# Patient Record
Sex: MALE | Race: WHITE | NOT HISPANIC OR LATINO | URBAN - METROPOLITAN AREA
[De-identification: names, ages, dates, MRNs, and addresses within clinical notes are randomized per-mention and may not be internally consistent; named-entity substitution may affect disease eponyms.]

---

## 2018-09-19 ENCOUNTER — INPATIENT (INPATIENT)
Facility: HOSPITAL | Age: 63
LOS: 5 days | Discharge: NURSING, REHAB OR LONG-TERM CARE | DRG: 871 | End: 2018-09-25
Attending: STUDENT IN AN ORGANIZED HEALTH CARE EDUCATION/TRAINING PROGRAM | Admitting: STUDENT IN AN ORGANIZED HEALTH CARE EDUCATION/TRAINING PROGRAM
Payer: COMMERCIAL

## 2018-09-19 VITALS
RESPIRATION RATE: 20 BRPM | DIASTOLIC BLOOD PRESSURE: 87 MMHG | TEMPERATURE: 100 F | OXYGEN SATURATION: 94 % | HEART RATE: 104 BPM | SYSTOLIC BLOOD PRESSURE: 174 MMHG

## 2018-09-19 LAB
ALBUMIN SERPL ELPH-MCNC: 3.2 G/DL — LOW (ref 3.4–5)
ALBUMIN SERPL ELPH-MCNC: 3.4 G/DL — SIGNIFICANT CHANGE UP (ref 3.3–5)
ALP SERPL-CCNC: 64 U/L — SIGNIFICANT CHANGE UP (ref 40–120)
ALP SERPL-CCNC: 74 U/L — SIGNIFICANT CHANGE UP (ref 40–120)
ALT FLD-CCNC: 54 U/L — HIGH (ref 10–45)
ALT FLD-CCNC: 78 U/L — HIGH (ref 12–42)
ANION GAP SERPL CALC-SCNC: 11 MMOL/L — SIGNIFICANT CHANGE UP (ref 5–17)
ANION GAP SERPL CALC-SCNC: 12 MMOL/L — SIGNIFICANT CHANGE UP (ref 9–16)
ANION GAP SERPL CALC-SCNC: 14 MMOL/L — SIGNIFICANT CHANGE UP (ref 5–17)
ANION GAP SERPL CALC-SCNC: 7 MMOL/L — LOW (ref 9–16)
APPEARANCE UR: CLEAR — SIGNIFICANT CHANGE UP
APTT BLD: 30.3 SEC — SIGNIFICANT CHANGE UP (ref 27.5–37.4)
AST SERPL-CCNC: 126 U/L — HIGH (ref 10–40)
AST SERPL-CCNC: 155 U/L — HIGH (ref 15–37)
BASOPHILS NFR BLD AUTO: 0.2 % — SIGNIFICANT CHANGE UP (ref 0–2)
BILIRUB SERPL-MCNC: 0.8 MG/DL — SIGNIFICANT CHANGE UP (ref 0.2–1.2)
BILIRUB SERPL-MCNC: 0.9 MG/DL — SIGNIFICANT CHANGE UP (ref 0.2–1.2)
BILIRUB UR-MCNC: NEGATIVE — SIGNIFICANT CHANGE UP
BLD GP AB SCN SERPL QL: NEGATIVE — SIGNIFICANT CHANGE UP
BLD GP AB SCN SERPL QL: NEGATIVE — SIGNIFICANT CHANGE UP
BUN SERPL-MCNC: 2 MG/DL — LOW (ref 7–23)
BUN SERPL-MCNC: 3 MG/DL — LOW (ref 7–23)
BUN SERPL-MCNC: <3 MG/DL — LOW (ref 7–23)
BUN SERPL-MCNC: <3 MG/DL — LOW (ref 7–23)
CALCIUM SERPL-MCNC: 7.3 MG/DL — LOW (ref 8.5–10.5)
CALCIUM SERPL-MCNC: 7.7 MG/DL — LOW (ref 8.4–10.5)
CALCIUM SERPL-MCNC: 8 MG/DL — LOW (ref 8.5–10.5)
CALCIUM SERPL-MCNC: 8.2 MG/DL — LOW (ref 8.4–10.5)
CHLORIDE SERPL-SCNC: 79 MMOL/L — LOW (ref 96–108)
CHLORIDE SERPL-SCNC: 82 MMOL/L — LOW (ref 96–108)
CHLORIDE SERPL-SCNC: 85 MMOL/L — LOW (ref 96–108)
CHLORIDE SERPL-SCNC: 88 MMOL/L — LOW (ref 96–108)
CO2 SERPL-SCNC: 22 MMOL/L — SIGNIFICANT CHANGE UP (ref 22–31)
CO2 SERPL-SCNC: 25 MMOL/L — SIGNIFICANT CHANGE UP (ref 22–31)
CO2 SERPL-SCNC: 26 MMOL/L — SIGNIFICANT CHANGE UP (ref 22–31)
CO2 SERPL-SCNC: 27 MMOL/L — SIGNIFICANT CHANGE UP (ref 22–31)
COLOR SPEC: YELLOW — SIGNIFICANT CHANGE UP
CREAT ?TM UR-MCNC: 16 MG/DL — SIGNIFICANT CHANGE UP
CREAT SERPL-MCNC: 0.41 MG/DL — LOW (ref 0.5–1.3)
CREAT SERPL-MCNC: 0.44 MG/DL — LOW (ref 0.5–1.3)
CREAT SERPL-MCNC: 0.62 MG/DL — SIGNIFICANT CHANGE UP (ref 0.5–1.3)
CREAT SERPL-MCNC: 0.63 MG/DL — SIGNIFICANT CHANGE UP (ref 0.5–1.3)
DIFF PNL FLD: NEGATIVE — SIGNIFICANT CHANGE UP
ETHANOL SERPL-MCNC: <3 MG/DL — SIGNIFICANT CHANGE UP
GLUCOSE SERPL-MCNC: 103 MG/DL — HIGH (ref 70–99)
GLUCOSE SERPL-MCNC: 80 MG/DL — SIGNIFICANT CHANGE UP (ref 70–99)
GLUCOSE SERPL-MCNC: 87 MG/DL — SIGNIFICANT CHANGE UP (ref 70–99)
GLUCOSE SERPL-MCNC: 92 MG/DL — SIGNIFICANT CHANGE UP (ref 70–99)
GLUCOSE UR QL: NEGATIVE — SIGNIFICANT CHANGE UP
HBV SURFACE AG SER-ACNC: SIGNIFICANT CHANGE UP
HCT VFR BLD CALC: 37.1 % — LOW (ref 39–50)
HCT VFR BLD CALC: 38.5 % — LOW (ref 39–50)
HCV AB S/CO SERPL IA: 0.57 S/CO — SIGNIFICANT CHANGE UP
HCV AB SERPL-IMP: SIGNIFICANT CHANGE UP
HGB BLD-MCNC: 13.7 G/DL — SIGNIFICANT CHANGE UP (ref 13–17)
HGB BLD-MCNC: 14.5 G/DL — SIGNIFICANT CHANGE UP (ref 13–17)
INR BLD: 0.9 — SIGNIFICANT CHANGE UP (ref 0.88–1.16)
KETONES UR-MCNC: NEGATIVE — SIGNIFICANT CHANGE UP
LACTATE SERPL-SCNC: 1.9 MMOL/L — SIGNIFICANT CHANGE UP (ref 0.4–2)
LEUKOCYTE ESTERASE UR-ACNC: NEGATIVE — SIGNIFICANT CHANGE UP
LYMPHOCYTES # BLD AUTO: 8.2 % — LOW (ref 13–44)
MAGNESIUM SERPL-MCNC: 1.5 MG/DL — LOW (ref 1.6–2.6)
MAGNESIUM SERPL-MCNC: 3 MG/DL — HIGH (ref 1.6–2.6)
MCHC RBC-ENTMCNC: 32.5 PG — SIGNIFICANT CHANGE UP (ref 27–34)
MCHC RBC-ENTMCNC: 33 PG — SIGNIFICANT CHANGE UP (ref 27–34)
MCHC RBC-ENTMCNC: 36.9 G/DL — HIGH (ref 32–36)
MCHC RBC-ENTMCNC: 37.7 G/DL — HIGH (ref 32–36)
MCV RBC AUTO: 87.7 FL — SIGNIFICANT CHANGE UP (ref 80–100)
MCV RBC AUTO: 87.9 FL — SIGNIFICANT CHANGE UP (ref 80–100)
MONOCYTES NFR BLD AUTO: 2.9 % — SIGNIFICANT CHANGE UP (ref 2–14)
NEUTROPHILS NFR BLD AUTO: 88.7 % — HIGH (ref 43–77)
NITRITE UR-MCNC: NEGATIVE — SIGNIFICANT CHANGE UP
OSMOLALITY SERPL: 256 MOSM/KG — LOW (ref 280–301)
OSMOLALITY UR: 139 MOSMOL/KG — SIGNIFICANT CHANGE UP (ref 100–650)
PCO2 BLDV: 34 MMHG — LOW (ref 41–51)
PCP SPEC-MCNC: SIGNIFICANT CHANGE UP
PH BLDV: 7.47 — HIGH (ref 7.32–7.43)
PH UR: 7 — SIGNIFICANT CHANGE UP (ref 5–8)
PHOSPHATE SERPL-MCNC: 2.1 MG/DL — LOW (ref 2.5–4.5)
PLATELET # BLD AUTO: 76 K/UL — LOW (ref 150–400)
PLATELET # BLD AUTO: 79 K/UL — LOW (ref 150–400)
PO2 BLDV: 37 MMHG — SIGNIFICANT CHANGE UP (ref 35–40)
POTASSIUM SERPL-MCNC: 3.2 MMOL/L — LOW (ref 3.5–5.3)
POTASSIUM SERPL-MCNC: 3.4 MMOL/L — LOW (ref 3.5–5.3)
POTASSIUM SERPL-MCNC: 3.6 MMOL/L — SIGNIFICANT CHANGE UP (ref 3.5–5.3)
POTASSIUM SERPL-MCNC: 3.9 MMOL/L — SIGNIFICANT CHANGE UP (ref 3.5–5.3)
POTASSIUM SERPL-SCNC: 3.2 MMOL/L — LOW (ref 3.5–5.3)
POTASSIUM SERPL-SCNC: 3.4 MMOL/L — LOW (ref 3.5–5.3)
POTASSIUM SERPL-SCNC: 3.6 MMOL/L — SIGNIFICANT CHANGE UP (ref 3.5–5.3)
POTASSIUM SERPL-SCNC: 3.9 MMOL/L — SIGNIFICANT CHANGE UP (ref 3.5–5.3)
PROT SERPL-MCNC: 6.2 G/DL — SIGNIFICANT CHANGE UP (ref 6–8.3)
PROT SERPL-MCNC: 6.9 G/DL — SIGNIFICANT CHANGE UP (ref 6.4–8.2)
PROT UR-MCNC: NEGATIVE MG/DL — SIGNIFICANT CHANGE UP
PROTHROM AB SERPL-ACNC: 10 SEC — SIGNIFICANT CHANGE UP (ref 9.8–12.7)
RBC # BLD: 4.22 M/UL — SIGNIFICANT CHANGE UP (ref 4.2–5.8)
RBC # BLD: 4.39 M/UL — SIGNIFICANT CHANGE UP (ref 4.2–5.8)
RBC # FLD: 11 % — SIGNIFICANT CHANGE UP (ref 10.3–14.5)
RBC # FLD: 11.2 % — SIGNIFICANT CHANGE UP (ref 10.3–16.9)
RH IG SCN BLD-IMP: POSITIVE — SIGNIFICANT CHANGE UP
RH IG SCN BLD-IMP: POSITIVE — SIGNIFICANT CHANGE UP
SAO2 % BLDV: 73 % — SIGNIFICANT CHANGE UP
SODIUM SERPL-SCNC: 113 MMOL/L — CRITICAL LOW (ref 132–145)
SODIUM SERPL-SCNC: 115 MMOL/L — CRITICAL LOW (ref 132–145)
SODIUM SERPL-SCNC: 124 MMOL/L — LOW (ref 135–145)
SODIUM SERPL-SCNC: 126 MMOL/L — LOW (ref 135–145)
SODIUM UR-SCNC: 20 MMOL/L — SIGNIFICANT CHANGE UP
SP GR SPEC: <=1.005 — SIGNIFICANT CHANGE UP (ref 1–1.03)
UROBILINOGEN FLD QL: 0.2 E.U./DL — SIGNIFICANT CHANGE UP
UUN UR-MCNC: 79 MG/DL — SIGNIFICANT CHANGE UP
WBC # BLD: 6.1 K/UL — SIGNIFICANT CHANGE UP (ref 3.8–10.5)
WBC # BLD: 7.4 K/UL — SIGNIFICANT CHANGE UP (ref 3.8–10.5)
WBC # FLD AUTO: 6.1 K/UL — SIGNIFICANT CHANGE UP (ref 3.8–10.5)
WBC # FLD AUTO: 7.4 K/UL — SIGNIFICANT CHANGE UP (ref 3.8–10.5)

## 2018-09-19 PROCEDURE — 99220: CPT

## 2018-09-19 PROCEDURE — 99223 1ST HOSP IP/OBS HIGH 75: CPT | Mod: GC

## 2018-09-19 PROCEDURE — 74177 CT ABD & PELVIS W/CONTRAST: CPT | Mod: 26

## 2018-09-19 PROCEDURE — 70450 CT HEAD/BRAIN W/O DYE: CPT | Mod: 26

## 2018-09-19 PROCEDURE — 71045 X-RAY EXAM CHEST 1 VIEW: CPT | Mod: 26

## 2018-09-19 PROCEDURE — 71260 CT THORAX DX C+: CPT | Mod: 26

## 2018-09-19 PROCEDURE — 72125 CT NECK SPINE W/O DYE: CPT | Mod: 26

## 2018-09-19 RX ORDER — INFLUENZA VIRUS VACCINE 15; 15; 15; 15 UG/.5ML; UG/.5ML; UG/.5ML; UG/.5ML
0.5 SUSPENSION INTRAMUSCULAR ONCE
Qty: 0 | Refills: 0 | Status: COMPLETED | OUTPATIENT
Start: 2018-09-19 | End: 2018-09-19

## 2018-09-19 RX ORDER — SODIUM CHLORIDE 9 MG/ML
1000 INJECTION INTRAMUSCULAR; INTRAVENOUS; SUBCUTANEOUS ONCE
Qty: 0 | Refills: 0 | Status: COMPLETED | OUTPATIENT
Start: 2018-09-19 | End: 2018-09-19

## 2018-09-19 RX ORDER — SODIUM CHLORIDE 9 MG/ML
1000 INJECTION, SOLUTION INTRAVENOUS
Qty: 0 | Refills: 0 | Status: DISCONTINUED | OUTPATIENT
Start: 2018-09-19 | End: 2018-09-19

## 2018-09-19 RX ORDER — SODIUM CHLORIDE 9 MG/ML
1000 INJECTION, SOLUTION INTRAVENOUS
Qty: 0 | Refills: 0 | Status: DISCONTINUED | OUTPATIENT
Start: 2018-09-19 | End: 2018-09-20

## 2018-09-19 RX ORDER — THIAMINE MONONITRATE (VIT B1) 100 MG
500 TABLET ORAL ONCE
Qty: 0 | Refills: 0 | Status: DISCONTINUED | OUTPATIENT
Start: 2018-09-19 | End: 2018-09-19

## 2018-09-19 RX ORDER — MAGNESIUM SULFATE 500 MG/ML
4 VIAL (ML) INJECTION ONCE
Qty: 0 | Refills: 0 | Status: COMPLETED | OUTPATIENT
Start: 2018-09-19 | End: 2018-09-19

## 2018-09-19 RX ORDER — FLUCONAZOLE 150 MG/1
200 TABLET ORAL EVERY 24 HOURS
Qty: 0 | Refills: 0 | Status: DISCONTINUED | OUTPATIENT
Start: 2018-09-20 | End: 2018-09-20

## 2018-09-19 RX ORDER — HEPARIN SODIUM 5000 [USP'U]/ML
5000 INJECTION INTRAVENOUS; SUBCUTANEOUS EVERY 8 HOURS
Qty: 0 | Refills: 0 | Status: DISCONTINUED | OUTPATIENT
Start: 2018-09-19 | End: 2018-09-25

## 2018-09-19 RX ORDER — PIPERACILLIN AND TAZOBACTAM 4; .5 G/20ML; G/20ML
3.38 INJECTION, POWDER, LYOPHILIZED, FOR SOLUTION INTRAVENOUS ONCE
Qty: 0 | Refills: 0 | Status: DISCONTINUED | OUTPATIENT
Start: 2018-09-19 | End: 2018-09-19

## 2018-09-19 RX ORDER — VANCOMYCIN HCL 1 G
1000 VIAL (EA) INTRAVENOUS ONCE
Qty: 0 | Refills: 0 | Status: DISCONTINUED | OUTPATIENT
Start: 2018-09-19 | End: 2018-09-19

## 2018-09-19 RX ORDER — POTASSIUM PHOSPHATE, MONOBASIC POTASSIUM PHOSPHATE, DIBASIC 236; 224 MG/ML; MG/ML
30 INJECTION, SOLUTION INTRAVENOUS ONCE
Qty: 0 | Refills: 0 | Status: COMPLETED | OUTPATIENT
Start: 2018-09-19 | End: 2018-09-19

## 2018-09-19 RX ORDER — PIPERACILLIN AND TAZOBACTAM 4; .5 G/20ML; G/20ML
3.38 INJECTION, POWDER, LYOPHILIZED, FOR SOLUTION INTRAVENOUS EVERY 6 HOURS
Qty: 0 | Refills: 0 | Status: DISCONTINUED | OUTPATIENT
Start: 2018-09-19 | End: 2018-09-25

## 2018-09-19 RX ORDER — THIAMINE MONONITRATE (VIT B1) 100 MG
500 TABLET ORAL EVERY 8 HOURS
Qty: 0 | Refills: 0 | Status: DISCONTINUED | OUTPATIENT
Start: 2018-09-19 | End: 2018-09-20

## 2018-09-19 RX ORDER — THIAMINE MONONITRATE (VIT B1) 100 MG
500 TABLET ORAL ONCE
Qty: 0 | Refills: 0 | Status: COMPLETED | OUTPATIENT
Start: 2018-09-19 | End: 2018-09-19

## 2018-09-19 RX ORDER — VANCOMYCIN HCL 1 G
1000 VIAL (EA) INTRAVENOUS EVERY 12 HOURS
Qty: 0 | Refills: 0 | Status: DISCONTINUED | OUTPATIENT
Start: 2018-09-19 | End: 2018-09-20

## 2018-09-19 RX ORDER — DESMOPRESSIN ACETATE 0.1 MG/1
1 TABLET ORAL ONCE
Qty: 0 | Refills: 0 | Status: COMPLETED | OUTPATIENT
Start: 2018-09-19 | End: 2018-09-19

## 2018-09-19 RX ORDER — FLUCONAZOLE 150 MG/1
200 TABLET ORAL EVERY 24 HOURS
Qty: 0 | Refills: 0 | Status: DISCONTINUED | OUTPATIENT
Start: 2018-09-19 | End: 2018-09-19

## 2018-09-19 RX ORDER — FLUCONAZOLE 150 MG/1
200 TABLET ORAL ONCE
Qty: 0 | Refills: 0 | Status: COMPLETED | OUTPATIENT
Start: 2018-09-19 | End: 2018-09-19

## 2018-09-19 RX ADMIN — HEPARIN SODIUM 5000 UNIT(S): 5000 INJECTION INTRAVENOUS; SUBCUTANEOUS at 22:45

## 2018-09-19 RX ADMIN — DESMOPRESSIN ACETATE 1 MICROGRAM(S): 0.1 TABLET ORAL at 17:03

## 2018-09-19 RX ADMIN — Medication 1 MILLIGRAM(S): at 09:22

## 2018-09-19 RX ADMIN — Medication 105 MILLIGRAM(S): at 22:45

## 2018-09-19 RX ADMIN — Medication 100 MILLIGRAM(S): at 15:54

## 2018-09-19 RX ADMIN — PIPERACILLIN AND TAZOBACTAM 200 GRAM(S): 4; .5 INJECTION, POWDER, LYOPHILIZED, FOR SOLUTION INTRAVENOUS at 17:09

## 2018-09-19 RX ADMIN — SODIUM CHLORIDE 200 MILLILITER(S): 9 INJECTION, SOLUTION INTRAVENOUS at 19:19

## 2018-09-19 RX ADMIN — Medication 105 MILLIGRAM(S): at 15:54

## 2018-09-19 RX ADMIN — Medication 100 GRAM(S): at 11:37

## 2018-09-19 RX ADMIN — SODIUM CHLORIDE 1000 MILLILITER(S): 9 INJECTION INTRAMUSCULAR; INTRAVENOUS; SUBCUTANEOUS at 08:31

## 2018-09-19 RX ADMIN — SODIUM CHLORIDE 2000 MILLILITER(S): 9 INJECTION, SOLUTION INTRAVENOUS at 16:48

## 2018-09-19 RX ADMIN — Medication 1 MILLIGRAM(S): at 12:00

## 2018-09-19 RX ADMIN — Medication 250 MILLIGRAM(S): at 11:38

## 2018-09-19 RX ADMIN — SODIUM CHLORIDE 1000 MILLILITER(S): 9 INJECTION INTRAMUSCULAR; INTRAVENOUS; SUBCUTANEOUS at 08:56

## 2018-09-19 RX ADMIN — POTASSIUM PHOSPHATE, MONOBASIC POTASSIUM PHOSPHATE, DIBASIC 85 MILLIMOLE(S): 236; 224 INJECTION, SOLUTION INTRAVENOUS at 19:20

## 2018-09-19 RX ADMIN — SODIUM CHLORIDE 100 MILLILITER(S): 9 INJECTION, SOLUTION INTRAVENOUS at 15:54

## 2018-09-19 RX ADMIN — Medication 1 MILLIGRAM(S): at 08:30

## 2018-09-19 NOTE — ED ADULT NURSE REASSESSMENT NOTE - NS ED NURSE REASSESS COMMENT FT1
patient cleaned of stool and urine incontinence, belongings bagged, placed on monitor, hospital gown

## 2018-09-19 NOTE — CONSULT NOTE ADULT - SUBJECTIVE AND OBJECTIVE BOX
63M with PMHx of alcohol abuse presented to [facility] due to altered mental status and alcohol withdrawal, found to have scrotal erythema, edema, and tenderness to palpation. Patient is poor historian given clinical condition and acutely altered. Patient presented with rash on scrotum and groin. Urology consulted to r/o Soni's gangrene.    Gen: Difficult to arouse  Abd: sNTND  : Erythematous scrotum without fluctuant, edema, tenderness, or crepitus. Perineum without fluctuance, tenderness, edema, or crepitus. Bilateral inner thighs with petechiae similar to that through bilateral lower extremities

## 2018-09-19 NOTE — ED CDU PROVIDER DISPOSITION NOTE - CLINICAL COURSE
admit to Dr [facility] ICU at [place] through [facility] BEDS line, sodium 113, alcohol withdrawal

## 2018-09-19 NOTE — CONSULT NOTE ADULT - ASSESSMENT
63M p/w alcohol withdrawal p/w scrotal erythema    -Low suspicion for Soni's gangrene at this time  -c/w abx  -agree with testicular u/s  -Care per primary team  -Urology to follow  -d/w chief resident and attending on call

## 2018-09-19 NOTE — ED ADULT NURSE REASSESSMENT NOTE - NS ED NURSE REASSESS COMMENT FT1
1mg ativan given iv as per verbal order dr meehan on transfer. all belongings to EMS with money counted and secured in envelope per policy and sent with EMS/patient

## 2018-09-19 NOTE — ED ADULT NURSE REASSESSMENT NOTE - NS ED NURSE REASSESS COMMENT FT1
patient s/p seizure, nasal trumpet had been placed and is still in place, O2 at 100%nrb, patient responds appropriately to noxious stimuli, VS noted. pending ct scan, radiology aware

## 2018-09-19 NOTE — ED PROVIDER NOTE - MEDICAL DECISION MAKING DETAILS
alcohol withdrawal, tremor, weakness, as per wife Brooklyn [phone] pt has alcohol problem for 30+ years, she called the [place] and he was supposed to go to rehab, Sobia from the [employer] local [phone] at [phone] was arranging this, patient is tremulous and has ecchymosis of inner left thigh and is wearing hospital socks, as per wife, patient left the last ER he was in and also declined rehab although sobia offered to have him picked up.  Will administer Ativan for tremors, Librium and will place in CDU, will consult SBIRT.

## 2018-09-19 NOTE — H&P ADULT - NSHPSOURCEINFOTX_GEN_ALL_CORE
Wife -Brooklyn ([phone]); [facility] [place] arranging alcohol rehab - [phone] at [phone])

## 2018-09-19 NOTE — ED ADULT NURSE NOTE - OBJECTIVE STATEMENT
patient tremulous, from work, oriented. reports last alcohol use was 2 days ago, patient incontinent of stool, large excoriation/bruise to buttocks and left thigh also left scalp with dried blood area, he does report recent fall

## 2018-09-19 NOTE — H&P ADULT - NSHPLABSRESULTS_GEN_ALL_CORE
.  LABS:                         13.7   6.1   )-----------( 76       ( 19 Sep 2018 14:53 )             37.1     09-19    115<LL>  |  82<L>  |  <3<L>  ----------------------------<  87  3.9   |  26  |  0.62    Ca    7.3<L>      19 Sep 2018 10:42  Mg     1.5     09-19    TPro  6.9  /  Alb  3.2<L>  /  TBili  0.8  /  DBili  x   /  AST  155<H>  /  ALT  78<H>  /  AlkPhos  74  09-19    PT/INR - ( 19 Sep 2018 14:53 )   PT: 10.0 sec;   INR: 0.90          PTT - ( 19 Sep 2018 14:53 )  PTT:30.3 sec  Urinalysis Basic - ( 19 Sep 2018 15:00 )    Color: Yellow / Appearance: Clear / SG: <=1.005 / pH: x  Gluc: x / Ketone: NEGATIVE  / Bili: Negative / Urobili: 0.2 E.U./dL   Blood: x / Protein: NEGATIVE mg/dL / Nitrite: NEGATIVE   Leuk Esterase: NEGATIVE / RBC: x / WBC x   Sq Epi: x / Non Sq Epi: x / Bacteria: x            Lactate, Blood: 1.9 mmoL/L (09-19 @ 11:23)      RADIOLOGY, EKG & ADDITIONAL TESTS: Reviewed.  < from: Xray Chest 1 View AP/PA (09.19.18 @ 08:29) >      IMPRESSION: Frontal view of the chest demonstrates moderate interstitial  pulmonary edema. Possible patchy consolidation within the right lower   lobe. Trace right pleural effusion. Normal heart size. Low lung volumes.    < from: CT Head No Cont (09.19.18 @ 10:41) >      IMPRESSION: No intracranial hemorrhage or acute transcortical infarct.   Probable old right nasal bone fracture.    < from: CT Cervical Spine No Cont (09.19.18 @ 10:42) >    IMPRESSION: Markedly limited examination secondary to motion artifact. No   definite fracture. Anterolisthesis C4and T1. Extensive cervical   spondylosis.

## 2018-09-19 NOTE — PATIENT PROFILE ADULT. - VISION (WITH CORRECTIVE LENSES IF THE PATIENT USUALLY WEARS THEM):
Reading only/Normal vision: sees adequately in most situations; can see medication labels, newsprint

## 2018-09-19 NOTE — ED ADULT NURSE NOTE - NSIMPLEMENTINTERV_GEN_ALL_ED
Implemented All Fall with Harm Risk Interventions:  [place] to call system. Call bell, personal items and telephone within reach. Instruct patient to call for assistance. Room bathroom lighting operational. Non-slip footwear when patient is off stretcher. Physically safe environment: no spills, clutter or unnecessary equipment. Stretcher in lowest position, wheels locked, appropriate side rails in place. Provide visual cue, wrist band, yellow gown, etc. Monitor gait and stability. Monitor for mental status changes and reorient to person, place, and time. Review medications for side effects contributing to fall risk. Reinforce activity limits and safety measures with patient and family. Provide visual clues: red socks.

## 2018-09-19 NOTE — ED CDU PROVIDER INITIAL DAY NOTE - MEDICAL DECISION MAKING DETAILS
alcohol withdrawal, tremor, weakness, as per wife Brooklyn [phone] pt has alcohol problem for 30+ years, she called the [place] and he was supposed to go to rehab, Sobia from the [employer] local [phone] at [phone] was arranging this, patient is tremulous and has ecchymosis of inner left thigh and is wearing hospital socks, as per wife, patient left the last ER he was in and also declined rehab although sobia offered to have him picked up.  Will administer Ativan for tremors, Librium and will place in CDU, will consult SBIRT.

## 2018-09-19 NOTE — ED ADULT NURSE REASSESSMENT NOTE - NS ED NURSE REASSESS COMMENT FT1
patient awake but agitated, has not pulled out nasal trumpet so will continue and also continue with 100%nrb for transfer and ativan iv dose given. report to EMS

## 2018-09-19 NOTE — H&P ADULT - ASSESSMENT
Patient is a 63M with PMHx of alcohol abuse x30 years presented to [facility] for alcohol withdrawal was found to have b/l ecchymoses on proximal inner thighs with associated clear discharge, warmth, tenderness to palpation, and erythema possibly due to cellulitis vs. necrotizing fasciitis vs. Soni's gangrene    ID  #Cellulitis vs. Necrotizing fasciitis: Patient presented with  b/l ecchymoses on proximal inner thighs with associated clear discharge, warmth, tenderness to palpation, and erythema   -c/w Clinda 900mg q8hr  -c/w fluconazole 200mg daily  -c/w zosyn 3.375g q6hr  -c/w vanc 1g q12hr. Vanc trough before 4th dose.  -f/u Testical US   -CT chest demonstrated b/l groundglass opacities that have a peripheral bronchovascular distribution possibly due to pulmonary hemorrhage and/or contusion as pattern is less typical for a multifocal pneumonia however PNA cannot be excluded. Pattern less typical for aspiration. Further, focal atelectatic change involving the posterior basal segment of the left lower lobe versus parenchymal contusion.  -CT A/P demonstrate distention of the urinary bladder with a suggestion of very mild bladder wall thickening with TURP defect that may represent the sequela of bladder outlet obstruction vs. cystitis  -hepatitis panel nonreactive  -UA negative    Neuro  #Alcohol withdrawal  -c/w librium  -c/w ativan 1mg q2hrs for CIWA >8  -c/w thiamine 500mg q8hr  -CIWA 7 (positive for tremulousness, confusion, agitation). C/w serial CIWA monitoring  -c/w multivitamin  -CT head negative for acute hemorrhage or infarct  -c/w D5% @200cc/hr    #Toxic Metabolic Encephalopathy: Patient acutely altered possibly due to alcohol withdrawal vs. infection   -c/w alcohol withdrawal tx as above  -c/w infection tx as above  -utox negative    Renal  BUN/Cr: 2/0.41. Continue to monitor  #Hyponatremia  -Na+ 113 on presentation. Uptrending to Na+ 126 s/p fluids  -continue to monitor    #Hypomagnesemia   -Mg 1.5 on admission. Mg 3 s/p repletion  -continue to monitor    #Hypokalemia  -K+ 3.2 on repeat BMP. s/p repletion  -f/u 8PM BMP    Pulm  #Respiratory Alkalosis: VBG demonstrating pH7.47 with pCO2 34. Likely due to initial respiratory distress from alcohol withdrawal vs. infection. Continue to monitor   -CT Chest findings concerning for possible contusion vs. PNA. Continue to monitor on antibx.   -Saturations 100% on NRFM. Titrated down to NC O2 4L with saturations in %. Continue to titrate O2 as needed    GI  #Transaminitis  -AST//54 - likely due to alcohol abuse given ratio >2:1. Continue to monitor  -CT A/P demonstrated hepatic steatosis  -hepatitis B/C negative    Endo  #Pressure Ulcers: Patient with b/l erythema overlying the DIP joints of toes on physical exam. Possibly 2/2 pressure ulcers   -f/u HbA1C as possibly from underlying DM    Heme  #Thrombocytopenia: Petechial rash appreciated with Plts 79  -no signs of bleeding and Hb 14.5. continue to monitor    CV: No acute hemodynamic instability. Continue to monitor    F: D5 @200cc/hr  E: Replete for K<4 and Mg <2  N: NPO until S&S clears    DVT ppx: Heparin SubQ   Dispo: MICU Patient is a 63M with PMHx of alcohol abuse x30 years presented to [facility] for alcohol withdrawal was found to have b/l ecchymoses on proximal inner thighs with associated clear discharge, warmth, tenderness to palpation, and erythema possibly due to cellulitis vs. necrotizing fasciitis vs. Soni's gangrene    ID  #Cellulitis vs. Necrotizing fasciitis: Patient presented with  b/l erythema on proximal inner thighs with associated clear discharge, warmth, tenderness to palpation, and erythema   -c/w Clinda 900mg q8hr for anti-toxin effect against strep for empiric gas gangrene coverage  -c/w fluconazole 200mg daily for empiric fungal coverage   -c/w zosyn 3.375g q6hr  -c/w vanc 1g q12hr. Vanc trough before 4th dose.   -f/u Testical US   -CT A/P with no signs of gas or abscess. These findings make nec fasciitis less likely.  Also demonstrate distention of the urinary bladder with a suggestion of very mild bladder wall thickening with TURP defect that may represent the sequela of bladder outlet obstruction vs. cystitis  -hepatitis panel nonreactive  -UA negative    Neuro  #Toxic Metabolic Encephalopathy: Patient acutely altered possibly due to alcohol withdrawal vs. infection vs. hyponatremia. Unlikely structural CT head negative for acute hemorrhage or infarct  -c/w alcohol withdrawal tx as below  -c/w infection tx as above  -utox negative  -hyponatremia plan as below    #Alcohol withdrawal  -c/w ativan 1mg q2hrs for CIWA >8  -c/w thiamine 500mg q8hr  -CIWA 7 (positive for tremulousness, confusion, agitation). C/w serial CIWA monitoring  -c/w D5% @200cc/hr    Renal  #Hyponatremia: Patient clinically mildly hypovolemic to euvolemic. Unclear etiology possibly related to hypovolemia (improved Na+ after 1L NS) vs. strongly considering beer potomania vs. dysregulation of ADH given patient's urine Osm low with low specific gravity and having very high free water output suggesting auto-diuresis  -Na+ 113 on presentation. Uptrending to Na+ 126 after collins placement and 1L NS  -in attempts to reverse overcorrection gave 1L D5W and started D5W @200cc/hr and gave 1 mcg of desmopresin IV  -goal Na+ by AM ~121.  -BMP q4hr    #Hypomagnesemia   -Mg 1.5 on admission. Mg 3 s/p repletion  -continue to monitor    #Hypokalemia  -K+ 3.2 on repeat BMP. s/p repletion  -f/u 8PM BMP    Pulm  #Respiratory Alkalosis: VBG demonstrating pH7.47 with pCO2 34. Likely due to initial respiratory distress from alcohol withdrawal vs. infection. Continue to monitor   -Saturations 100% on NRFM. Titrated down to NC O2 4L with saturations in %. Continue to titrate O2 as needed  -CT chest demonstrated b/l groundglass opacities that have a peripheral bronchovascular distribution possibly due to pulmonary hemorrhage and/or contusion as pattern is less typical for a multifocal pneumonia however PNA cannot be excluded. Pattern less typical for aspiration. Further, focal atelectatic change involving the posterior basal segment of the left lower lobe versus parenchymal contusion. Continue to monitor on antibx.     GI  #Transaminitis  -AST//54 - likely due to alcohol abuse given ratio >2:1. Continue to monitor  -CT A/P demonstrated hepatic steatosis  -hepatitis B/C negative    Endo  #Pressure Ulcers: Patient with b/l erythema overlying the DIP joints of toes on physical exam. Possibly 2/2 pressure ulcers   -f/u HbA1C as possibly from underlying DM    Heme  #Thrombocytopenia: Petechial rash appreciated with Plts 79 most likely due to alcoholism  -no signs of bleeding and Hb 14.5. continue to monitor    CV: No acute hemodynamic instability. Continue to monitor    F: D5 @200cc/hr  E: Replete for K<4 and Mg <2  N: NPO until S&S clears    DVT ppx: Heparin SubQ   Dispo: MICU Patient is a 63M with PMHx of alcohol abuse x30 years presented to [facility] for alcohol withdrawal was found to have b/l ecchymoses on proximal inner thighs with associated clear discharge, warmth, tenderness to palpation, and erythema possibly due to cellulitis vs. necrotizing fasciitis vs. Soni's gangrene with hyponatremia and seizure    ID  #Cellulitis vs. Necrotizing fasciitis: Patient presented with  b/l erythema on proximal inner thighs with associated clear discharge, warmth, tenderness to palpation, and erythema   -c/w Clinda 900mg q8hr for anti-toxin effect against strep for empiric gas gangrene coverage  -c/w fluconazole 200mg daily for empiric fungal coverage   -c/w zosyn 3.375g q6hr  -c/w vanc 1g q12hr. Vanc trough before 4th dose.   -f/u Testical US   -CT A/P with no signs of gas or abscess. These findings make nec fasciitis less likely.  Also demonstrate distention of the urinary bladder with a suggestion of very mild bladder wall thickening with TURP defect that may represent the sequela of bladder outlet obstruction vs. cystitis  -hepatitis panel nonreactive  -UA negative    Neuro  #Toxic Metabolic Encephalopathy: Patient acutely altered possibly due to alcohol withdrawal vs. infection vs. hyponatremia. Unlikely structural CT head negative for acute hemorrhage or infarct  -c/w alcohol withdrawal tx as below  -c/w infection tx as above  -utox negative  -hyponatremia plan as below    #Alcohol withdrawal  -c/w ativan 1mg q2hrs for CIWA >8  -c/w thiamine 500mg q8hr  -CIWA 7 (positive for tremulousness, confusion, agitation). C/w serial CIWA monitoring  -c/w D5% @200cc/hr    Renal  #Hyponatremia: Patient clinically mildly hypovolemic to euvolemic. Unclear etiology possibly related to hypovolemia (improved Na+ after 1L NS) vs. strongly considering beer potomania vs. dysregulation of ADH given patient's urine Osm low with low specific gravity and having very high free water output suggesting auto-diuresis  -Na+ 113 on presentation. Uptrending to Na+ 126 after collins placement and 1L NS  -in attempts to reverse overcorrection gave 1L D5W and started D5W @200cc/hr and gave 1 mcg of desmopresin IV  -goal Na+ by AM ~121.  -BMP q4hr    #Hypomagnesemia   -Mg 1.5 on admission. Mg 3 s/p repletion  -continue to monitor    #Hypokalemia  -K+ 3.2 on repeat BMP. s/p repletion  -f/u 8PM BMP    Pulm  #Respiratory Alkalosis: VBG demonstrating pH7.47 with pCO2 34. Likely due to initial respiratory distress from alcohol withdrawal vs. infection. Continue to monitor   -Saturations 100% on NRFM. Titrated down to NC O2 4L with saturations in %. Continue to titrate O2 as needed  -CT chest demonstrated b/l groundglass opacities that have a peripheral bronchovascular distribution possibly due to pulmonary hemorrhage and/or contusion as pattern is less typical for a multifocal pneumonia however PNA cannot be excluded. Pattern less typical for aspiration. Further, focal atelectatic change involving the posterior basal segment of the left lower lobe versus parenchymal contusion. Continue to monitor on antibx.     GI  #Transaminitis  -AST//54 - likely due to alcohol abuse given ratio >2:1. Continue to monitor  -CT A/P demonstrated hepatic steatosis  -hepatitis B/C negative    Endo  #Pressure Ulcers: Patient with b/l erythema overlying the DIP joints of toes on physical exam. Possibly 2/2 pressure ulcers   -f/u HbA1C as possibly from underlying DM    Heme  #Thrombocytopenia: Petechial rash appreciated with Plts 79 most likely due to alcoholism  -no signs of bleeding and Hb 14.5. continue to monitor    CV: No acute hemodynamic instability. Continue to monitor    F: D5 @200cc/hr  E: Replete for K<4 and Mg <2  N: NPO until S&S clears    DVT ppx: Heparin SubQ   Dispo: MICU

## 2018-09-19 NOTE — H&P ADULT - HISTORY OF PRESENT ILLNESS
Patient a 63year old male with PMHx of alcohol abuse presented to [facility] due to altered mental status and alcohol withdrawal, found to have scrotal erythema, edema, and tenderness to palpation. Patient is poor historian given clinical condition and acutely altered. Patient a 63year old male with PMHx of alcohol abuse presented to [facility] due to altered mental status and alcohol withdrawal, found to have scrotal erythema, edema, and tenderness to palpation. Patient is poor historian given clinical condition and acutely altered. Collateral at [facility] obtained with patient's wife. Per wife, patient had alcohol abuse for 30+ years. Patient was supposed to go to rehab and this was being arranged but patient declined. Per patient's wife, he was tremulous and weak  with ecchymosis of inner left thigh. Upon presentation to the ED at [facility], patient was in apparent alcohol withdrawal and had witnessed seizure. Last alcoholic beverage reportedly 2 days prior to presentation.   In ED Patient a 63year old male with PMHx of alcohol abuse presented to [facility] due to altered mental status and alcohol withdrawal, found to have scrotal erythema, edema, and tenderness to palpation. Patient is poor historian given clinical condition and acutely altered. Collateral at [facility] obtained with patient's wife. Per wife, patient had alcohol abuse for 30+ years. Patient was supposed to go to rehab and this was being arranged but patient declined. Per patient's wife, he was tremulous and weak  with ecchymosis of inner left thigh. Upon presentation to the ED at [facility], patient was in apparent alcohol withdrawal and had witnessed seizure and was given ativan, supp O2, and nasal trumpet placed. Last alcoholic beverage reportedly 2 days prior to presentation.     In ED: Temp 99.6, 174/104, , RR 20, SpO2 94% on RA. Labs significant forNa+ 113, Mg 1.5, K 3.6, AST//78, WBC 7.4, Hb 14.5. Patient given Ativan 1mg x4, IV Magnesium 4g, Vanc x1, 1L IV NS. CT head negative for acute intracranial hemorrhage or infarct. CT A/P with no signs of gas or abscess. Also demonstrated distention of the urinary bladder with a suggestion of very mild bladder wall thickening with TURP defect that may represent the sequela of bladder outlet obstruction vs. cystitis

## 2018-09-19 NOTE — ED ADULT TRIAGE NOTE - CHIEF COMPLAINT QUOTE
BIBA for AMS, as per ems he was picked up at his place of work where he hasn't not been in two weeks. Patient appears confused as to situation. Unable to state where he's been in the last two weeks. Denies any medical history. Admits to drinking etoh, last drink was 2 days ago. Noted to have old closed laceration to back of the head. Unable to visualize at this time. Also noted to have defecated on himself.

## 2018-09-19 NOTE — ED PROVIDER NOTE - OBJECTIVE STATEMENT
alcohol withdrawal, tremor, weakness, as per wife Brooklyn [phone] pt has alcohol problem for 30+ years, she called the [place] and he was supposed to go to rehab, Sobia from the [facility] [place] local [phone] at [phone] was arranging this

## 2018-09-19 NOTE — ED PROVIDER NOTE - RAPID ASSESSMENT
BIBEMS with ams, tremulous.  AOB.  No VS derangements on arrival.  Workup ordered. BIBEMS with ams, tremulous.  AOB.  No VS derangements on arrival.  Workup ordered.  As per EMS two recent transports this month.  NO previous visits here

## 2018-09-19 NOTE — ED CDU PROVIDER INITIAL DAY NOTE - OBJECTIVE STATEMENT
alcohol withdrawal, tremor, weakness, as per wife Brooklyn [phone] pt has alcohol problem for 30+ years, she called the [place] and he was supposed to go to rehab, Sobia from the [employer] local [address] at [phone] was arranging this, patient is tremulous and has ecchymosis of inner left thigh and is wearing hospital socks, as per wife, patient left the last ER he was in and also declined rehab although sobia offered to have him picked up.  Will administer Ativan for tremors, Librium and will place in CDU, will consult SBIRT.

## 2018-09-19 NOTE — H&P ADULT - NSHPSOCIALHISTORY_GEN_ALL_CORE
Tobacco use: Unable to assess given clinical condition.   EtOH use: Alcohol abuse for 30 years per patient's wife. Patient reportedly had last drink 2 days prior to presentation.  Illicit drug use: Unable to assess given clinical condition

## 2018-09-19 NOTE — H&P ADULT - NSHPPHYSICALEXAM_GEN_ALL_CORE
.  VITAL SIGNS:  T(C): 37.5 (09-19-18 @ 15:00), Max: 37.7 (09-19-18 @ 08:17)  T(F): 99.5 (09-19-18 @ 15:00), Max: 99.9 (09-19-18 @ 08:17)  HR: 106 (09-19-18 @ 15:00) (101 - 113)  BP: 101/66 (09-19-18 @ 15:00) (101/66 - 191/100)  BP(mean): 76 (09-19-18 @ 15:00) (76 - 95)  RR: 33 (09-19-18 @ 15:00) (16 - 33)  SpO2: 100% (09-19-18 @ 15:00) (94% - 100%)  Wt(kg): --    PHYSICAL EXAM:    Constitutional: Patient able to follow some simple commands but not cooperative with exam.   Head: NC/AT  Eyes: PERRL, EOMI, clear conjunctiva  Neck: supple; no JVD   Respiratory: b/l rales and rhonchi appreciated in multiple lung fields. No wheezes. No accessory muscles use for inspiration on nonrebreather mask  Cardiac: +S1/S2; RRR; no murmur, rub, or gallop.  Gastrointestinal: soft, NT/ND; no rebound or guarding; +BSx4  Genitourinary: Scrotal erythema present. Scrotum tender to light palpation.   Extremities: Petechial rash positive especially over anterior left leg. B/l erythema over DIP joints of toes. Tenderness and erythema over b/l proximal and medial thigh. No edema, no cyanosis b/l.   Vascular: 2+ radial, femoral, DP pulses B/L  Dermatologic:  Extremities warm.   Neurologic: Limited due to clinical condition. Awake and alert but not oriented x3. Spontaneously moving extremities x4.

## 2018-09-20 LAB
-  CANDIDA ALBICANS: SIGNIFICANT CHANGE UP
-  CANDIDA GLABRATA: SIGNIFICANT CHANGE UP
-  CANDIDA KRUSEI: SIGNIFICANT CHANGE UP
-  CANDIDA PARAPSILOSIS: SIGNIFICANT CHANGE UP
-  CANDIDA TROPICALIS: SIGNIFICANT CHANGE UP
-  COAGULASE NEGATIVE STAPHYLOCOCCUS: SIGNIFICANT CHANGE UP
-  K. PNEUMONIAE GROUP: SIGNIFICANT CHANGE UP
-  KPC RESISTANCE GENE: SIGNIFICANT CHANGE UP
-  STREPTOCOCCUS SP. (NOT GRP A, B OR S PNEUMONIAE): SIGNIFICANT CHANGE UP
A BAUMANNII DNA SPEC QL NAA+PROBE: SIGNIFICANT CHANGE UP
ALBUMIN SERPL ELPH-MCNC: 2.6 G/DL — LOW (ref 3.3–5)
ALP SERPL-CCNC: 54 U/L — SIGNIFICANT CHANGE UP (ref 40–120)
ALT FLD-CCNC: 41 U/L — SIGNIFICANT CHANGE UP (ref 10–45)
ANION GAP SERPL CALC-SCNC: 11 MMOL/L — SIGNIFICANT CHANGE UP (ref 5–17)
ANION GAP SERPL CALC-SCNC: 12 MMOL/L — SIGNIFICANT CHANGE UP (ref 5–17)
ANION GAP SERPL CALC-SCNC: 14 MMOL/L — SIGNIFICANT CHANGE UP (ref 5–17)
AST SERPL-CCNC: 91 U/L — HIGH (ref 10–40)
BILIRUB SERPL-MCNC: 1.2 MG/DL — SIGNIFICANT CHANGE UP (ref 0.2–1.2)
BUN SERPL-MCNC: 2 MG/DL — LOW (ref 7–23)
BUN SERPL-MCNC: 3 MG/DL — LOW (ref 7–23)
BUN SERPL-MCNC: 3 MG/DL — LOW (ref 7–23)
BUN SERPL-MCNC: 4 MG/DL — LOW (ref 7–23)
BUN SERPL-MCNC: 4 MG/DL — LOW (ref 7–23)
CALCIUM SERPL-MCNC: 7.4 MG/DL — LOW (ref 8.4–10.5)
CALCIUM SERPL-MCNC: 7.6 MG/DL — LOW (ref 8.4–10.5)
CALCIUM SERPL-MCNC: 7.8 MG/DL — LOW (ref 8.4–10.5)
CALCIUM SERPL-MCNC: 7.9 MG/DL — LOW (ref 8.4–10.5)
CALCIUM SERPL-MCNC: 8 MG/DL — LOW (ref 8.4–10.5)
CHLORIDE SERPL-SCNC: 87 MMOL/L — LOW (ref 96–108)
CHLORIDE SERPL-SCNC: 89 MMOL/L — LOW (ref 96–108)
CHLORIDE SERPL-SCNC: 90 MMOL/L — LOW (ref 96–108)
CHLORIDE SERPL-SCNC: 92 MMOL/L — LOW (ref 96–108)
CHLORIDE SERPL-SCNC: 93 MMOL/L — LOW (ref 96–108)
CO2 SERPL-SCNC: 22 MMOL/L — SIGNIFICANT CHANGE UP (ref 22–31)
CO2 SERPL-SCNC: 23 MMOL/L — SIGNIFICANT CHANGE UP (ref 22–31)
CO2 SERPL-SCNC: 24 MMOL/L — SIGNIFICANT CHANGE UP (ref 22–31)
CO2 SERPL-SCNC: 25 MMOL/L — SIGNIFICANT CHANGE UP (ref 22–31)
CO2 SERPL-SCNC: 26 MMOL/L — SIGNIFICANT CHANGE UP (ref 22–31)
CREAT SERPL-MCNC: 0.48 MG/DL — LOW (ref 0.5–1.3)
CREAT SERPL-MCNC: 0.5 MG/DL — SIGNIFICANT CHANGE UP (ref 0.5–1.3)
CREAT SERPL-MCNC: 0.52 MG/DL — SIGNIFICANT CHANGE UP (ref 0.5–1.3)
CREAT SERPL-MCNC: 0.53 MG/DL — SIGNIFICANT CHANGE UP (ref 0.5–1.3)
CREAT SERPL-MCNC: 0.55 MG/DL — SIGNIFICANT CHANGE UP (ref 0.5–1.3)
E CLOAC COMP DNA BLD POS QL NAA+PROBE: SIGNIFICANT CHANGE UP
E COLI DNA BLD POS QL NAA+NON-PROBE: SIGNIFICANT CHANGE UP
ENTEROCOC DNA BLD POS QL NAA+NON-PROBE: SIGNIFICANT CHANGE UP
ENTEROCOC DNA BLD POS QL NAA+NON-PROBE: SIGNIFICANT CHANGE UP
GLUCOSE SERPL-MCNC: 66 MG/DL — LOW (ref 70–99)
GLUCOSE SERPL-MCNC: 69 MG/DL — LOW (ref 70–99)
GLUCOSE SERPL-MCNC: 74 MG/DL — SIGNIFICANT CHANGE UP (ref 70–99)
GLUCOSE SERPL-MCNC: 81 MG/DL — SIGNIFICANT CHANGE UP (ref 70–99)
GLUCOSE SERPL-MCNC: 89 MG/DL — SIGNIFICANT CHANGE UP (ref 70–99)
GP B STREP DNA BLD POS QL NAA+NON-PROBE: SIGNIFICANT CHANGE UP
GRAM STN FLD: SIGNIFICANT CHANGE UP
GRAM STN FLD: SIGNIFICANT CHANGE UP
HAEM INFLU DNA BLD POS QL NAA+NON-PROBE: SIGNIFICANT CHANGE UP
HAV IGM SER-ACNC: SIGNIFICANT CHANGE UP
HBA1C BLD-MCNC: 5.1 % — SIGNIFICANT CHANGE UP (ref 4–5.6)
HBV CORE IGM SER-ACNC: SIGNIFICANT CHANGE UP
HCT VFR BLD CALC: 32.6 % — LOW (ref 39–50)
HGB BLD-MCNC: 12 G/DL — LOW (ref 13–17)
K OXYTOCA DNA BLD POS QL NAA+NON-PROBE: SIGNIFICANT CHANGE UP
L MONOCYTOG DNA BLD POS QL NAA+NON-PROBE: SIGNIFICANT CHANGE UP
MAGNESIUM SERPL-MCNC: 2.3 MG/DL — SIGNIFICANT CHANGE UP (ref 1.6–2.6)
MCHC RBC-ENTMCNC: 33 PG — SIGNIFICANT CHANGE UP (ref 27–34)
MCHC RBC-ENTMCNC: 36.8 G/DL — HIGH (ref 32–36)
MCV RBC AUTO: 89.6 FL — SIGNIFICANT CHANGE UP (ref 80–100)
METHOD TYPE: SIGNIFICANT CHANGE UP
MRSA SPEC QL CULT: SIGNIFICANT CHANGE UP
MSSA DNA SPEC QL NAA+PROBE: SIGNIFICANT CHANGE UP
N MEN ISLT CULT: SIGNIFICANT CHANGE UP
P AERUGINOSA DNA BLD POS NAA+NON-PROBE: SIGNIFICANT CHANGE UP
PHOSPHATE SERPL-MCNC: 3 MG/DL — SIGNIFICANT CHANGE UP (ref 2.5–4.5)
PLATELET # BLD AUTO: 69 K/UL — LOW (ref 150–400)
POTASSIUM SERPL-MCNC: 3.3 MMOL/L — LOW (ref 3.5–5.3)
POTASSIUM SERPL-MCNC: 3.4 MMOL/L — LOW (ref 3.5–5.3)
POTASSIUM SERPL-MCNC: 3.5 MMOL/L — SIGNIFICANT CHANGE UP (ref 3.5–5.3)
POTASSIUM SERPL-MCNC: 3.6 MMOL/L — SIGNIFICANT CHANGE UP (ref 3.5–5.3)
POTASSIUM SERPL-MCNC: 4.1 MMOL/L — SIGNIFICANT CHANGE UP (ref 3.5–5.3)
POTASSIUM SERPL-SCNC: 3.3 MMOL/L — LOW (ref 3.5–5.3)
POTASSIUM SERPL-SCNC: 3.4 MMOL/L — LOW (ref 3.5–5.3)
POTASSIUM SERPL-SCNC: 3.5 MMOL/L — SIGNIFICANT CHANGE UP (ref 3.5–5.3)
POTASSIUM SERPL-SCNC: 3.6 MMOL/L — SIGNIFICANT CHANGE UP (ref 3.5–5.3)
POTASSIUM SERPL-SCNC: 4.1 MMOL/L — SIGNIFICANT CHANGE UP (ref 3.5–5.3)
PROT SERPL-MCNC: 5.3 G/DL — LOW (ref 6–8.3)
RBC # BLD: 3.64 M/UL — LOW (ref 4.2–5.8)
RBC # FLD: 11.5 % — SIGNIFICANT CHANGE UP (ref 10.3–16.9)
S MARCESCENS DNA BLD POS NAA+NON-PROBE: SIGNIFICANT CHANGE UP
S PNEUM DNA BLD POS QL NAA+NON-PROBE: SIGNIFICANT CHANGE UP
S PYO DNA BLD POS QL NAA+NON-PROBE: SIGNIFICANT CHANGE UP
SODIUM SERPL-SCNC: 122 MMOL/L — LOW (ref 135–145)
SODIUM SERPL-SCNC: 126 MMOL/L — LOW (ref 135–145)
SODIUM SERPL-SCNC: 127 MMOL/L — LOW (ref 135–145)
SODIUM SERPL-SCNC: 128 MMOL/L — LOW (ref 135–145)
SODIUM SERPL-SCNC: 129 MMOL/L — LOW (ref 135–145)
VANCOMYCIN TROUGH SERPL-MCNC: 6.5 UG/ML — LOW (ref 10–20)
WBC # BLD: 5 K/UL — SIGNIFICANT CHANGE UP (ref 3.8–10.5)
WBC # FLD AUTO: 5 K/UL — SIGNIFICANT CHANGE UP (ref 3.8–10.5)

## 2018-09-20 PROCEDURE — 99233 SBSQ HOSP IP/OBS HIGH 50: CPT | Mod: GC

## 2018-09-20 RX ORDER — CHLORHEXIDINE GLUCONATE 213 G/1000ML
1 SOLUTION TOPICAL
Qty: 0 | Refills: 0 | Status: DISCONTINUED | OUTPATIENT
Start: 2018-09-20 | End: 2018-09-22

## 2018-09-20 RX ORDER — FLUCONAZOLE 150 MG/1
200 TABLET ORAL EVERY 24 HOURS
Qty: 0 | Refills: 0 | Status: DISCONTINUED | OUTPATIENT
Start: 2018-09-20 | End: 2018-09-25

## 2018-09-20 RX ORDER — NYSTATIN CREAM 100000 [USP'U]/G
1 CREAM TOPICAL
Qty: 0 | Refills: 0 | Status: DISCONTINUED | OUTPATIENT
Start: 2018-09-20 | End: 2018-09-22

## 2018-09-20 RX ORDER — VANCOMYCIN HCL 1 G
500 VIAL (EA) INTRAVENOUS ONCE
Qty: 0 | Refills: 0 | Status: COMPLETED | OUTPATIENT
Start: 2018-09-20 | End: 2018-09-20

## 2018-09-20 RX ORDER — FOLIC ACID 0.8 MG
1 TABLET ORAL DAILY
Qty: 0 | Refills: 0 | Status: DISCONTINUED | OUTPATIENT
Start: 2018-09-20 | End: 2018-09-25

## 2018-09-20 RX ORDER — SODIUM CHLORIDE 9 MG/ML
1000 INJECTION, SOLUTION INTRAVENOUS
Qty: 0 | Refills: 0 | Status: DISCONTINUED | OUTPATIENT
Start: 2018-09-20 | End: 2018-09-20

## 2018-09-20 RX ORDER — POTASSIUM CHLORIDE 20 MEQ
40 PACKET (EA) ORAL ONCE
Qty: 0 | Refills: 0 | Status: COMPLETED | OUTPATIENT
Start: 2018-09-20 | End: 2018-09-20

## 2018-09-20 RX ORDER — POTASSIUM CHLORIDE 20 MEQ
40 PACKET (EA) ORAL EVERY 4 HOURS
Qty: 0 | Refills: 0 | Status: COMPLETED | OUTPATIENT
Start: 2018-09-20 | End: 2018-09-20

## 2018-09-20 RX ORDER — THIAMINE MONONITRATE (VIT B1) 100 MG
100 TABLET ORAL DAILY
Qty: 0 | Refills: 0 | Status: DISCONTINUED | OUTPATIENT
Start: 2018-09-20 | End: 2018-09-25

## 2018-09-20 RX ORDER — POTASSIUM CHLORIDE 20 MEQ
10 PACKET (EA) ORAL
Qty: 0 | Refills: 0 | Status: COMPLETED | OUTPATIENT
Start: 2018-09-20 | End: 2018-09-20

## 2018-09-20 RX ORDER — POTASSIUM CHLORIDE 20 MEQ
20 PACKET (EA) ORAL
Qty: 0 | Refills: 0 | Status: DISCONTINUED | OUTPATIENT
Start: 2018-09-20 | End: 2018-09-20

## 2018-09-20 RX ORDER — SODIUM CHLORIDE 9 MG/ML
1000 INJECTION, SOLUTION INTRAVENOUS
Qty: 0 | Refills: 0 | Status: DISCONTINUED | OUTPATIENT
Start: 2018-09-20 | End: 2018-09-21

## 2018-09-20 RX ORDER — VANCOMYCIN HCL 1 G
1250 VIAL (EA) INTRAVENOUS EVERY 12 HOURS
Qty: 0 | Refills: 0 | Status: DISCONTINUED | OUTPATIENT
Start: 2018-09-21 | End: 2018-09-21

## 2018-09-20 RX ORDER — POTASSIUM PHOSPHATE, MONOBASIC POTASSIUM PHOSPHATE, DIBASIC 236; 224 MG/ML; MG/ML
15 INJECTION, SOLUTION INTRAVENOUS ONCE
Qty: 0 | Refills: 0 | Status: COMPLETED | OUTPATIENT
Start: 2018-09-20 | End: 2018-09-20

## 2018-09-20 RX ADMIN — SODIUM CHLORIDE 100 MILLILITER(S): 9 INJECTION, SOLUTION INTRAVENOUS at 19:20

## 2018-09-20 RX ADMIN — PIPERACILLIN AND TAZOBACTAM 200 GRAM(S): 4; .5 INJECTION, POWDER, LYOPHILIZED, FOR SOLUTION INTRAVENOUS at 19:05

## 2018-09-20 RX ADMIN — NYSTATIN CREAM 1 APPLICATION(S): 100000 CREAM TOPICAL at 19:28

## 2018-09-20 RX ADMIN — FLUCONAZOLE 200 MILLIGRAM(S): 150 TABLET ORAL at 23:20

## 2018-09-20 RX ADMIN — FLUCONAZOLE 100 MILLIGRAM(S): 150 TABLET ORAL at 00:37

## 2018-09-20 RX ADMIN — Medication 1 TABLET(S): at 12:59

## 2018-09-20 RX ADMIN — Medication 1 MILLIGRAM(S): at 03:50

## 2018-09-20 RX ADMIN — Medication 40 MILLIEQUIVALENT(S): at 15:57

## 2018-09-20 RX ADMIN — HEPARIN SODIUM 5000 UNIT(S): 5000 INJECTION INTRAVENOUS; SUBCUTANEOUS at 14:34

## 2018-09-20 RX ADMIN — HEPARIN SODIUM 5000 UNIT(S): 5000 INJECTION INTRAVENOUS; SUBCUTANEOUS at 05:32

## 2018-09-20 RX ADMIN — PIPERACILLIN AND TAZOBACTAM 200 GRAM(S): 4; .5 INJECTION, POWDER, LYOPHILIZED, FOR SOLUTION INTRAVENOUS at 12:58

## 2018-09-20 RX ADMIN — PIPERACILLIN AND TAZOBACTAM 200 GRAM(S): 4; .5 INJECTION, POWDER, LYOPHILIZED, FOR SOLUTION INTRAVENOUS at 00:38

## 2018-09-20 RX ADMIN — PIPERACILLIN AND TAZOBACTAM 200 GRAM(S): 4; .5 INJECTION, POWDER, LYOPHILIZED, FOR SOLUTION INTRAVENOUS at 06:31

## 2018-09-20 RX ADMIN — HEPARIN SODIUM 5000 UNIT(S): 5000 INJECTION INTRAVENOUS; SUBCUTANEOUS at 21:40

## 2018-09-20 RX ADMIN — Medication 40 MILLIEQUIVALENT(S): at 12:24

## 2018-09-20 RX ADMIN — Medication 250 MILLIGRAM(S): at 00:37

## 2018-09-20 RX ADMIN — Medication 100 MILLIEQUIVALENT(S): at 07:24

## 2018-09-20 RX ADMIN — POTASSIUM PHOSPHATE, MONOBASIC POTASSIUM PHOSPHATE, DIBASIC 62.5 MILLIMOLE(S): 236; 224 INJECTION, SOLUTION INTRAVENOUS at 05:31

## 2018-09-20 RX ADMIN — Medication 40 MILLIEQUIVALENT(S): at 07:24

## 2018-09-20 RX ADMIN — Medication 1 MILLIGRAM(S): at 21:40

## 2018-09-20 RX ADMIN — Medication 250 MILLIGRAM(S): at 12:23

## 2018-09-20 RX ADMIN — Medication 250 MILLIGRAM(S): at 23:15

## 2018-09-20 RX ADMIN — Medication 100 MILLIGRAM(S): at 21:40

## 2018-09-20 RX ADMIN — Medication 100 MILLIGRAM(S): at 00:38

## 2018-09-20 RX ADMIN — Medication 100 MILLIEQUIVALENT(S): at 03:53

## 2018-09-20 RX ADMIN — CHLORHEXIDINE GLUCONATE 1 APPLICATION(S): 213 SOLUTION TOPICAL at 16:00

## 2018-09-20 RX ADMIN — Medication 105 MILLIGRAM(S): at 07:24

## 2018-09-20 RX ADMIN — Medication 100 MILLIEQUIVALENT(S): at 06:00

## 2018-09-20 RX ADMIN — Medication 100 MILLIGRAM(S): at 07:24

## 2018-09-20 NOTE — ADVANCED PRACTICE NURSE CONSULT - ASSESSMENT
Patient presented with severe fungal rash of pubic area, scrotum, bilateral inguinal area, buttocks and posterior thighs, likely secondary to bowel incontinence. Rectal tube and indwelling urinary catheter in place.

## 2018-09-20 NOTE — ADVANCED PRACTICE NURSE CONSULT - REASON FOR CONSULT
[facility] nurse consult for 63year old male with PMHx of alcohol abuse presented to [facility] due to altered mental status and alcohol withdrawal, found to have scrotal erythema, edema, and tenderness to palpation.

## 2018-09-20 NOTE — PROGRESS NOTE ADULT - ASSESSMENT
Patient is a 63M with PMHx of alcohol abuse x30 years presented to [facility] for alcohol withdrawal was found to have b/l ecchymoses on proximal inner thighs with associated clear discharge, warmth, tenderness to palpation, and erythema possibly due to cellulitis vs. necrotizing fasciitis vs. Soni's gangrene with hyponatremia and seizure    ID  #Cellulitis vs. Necrotizing fasciitis: Patient presented with  b/l erythema on proximal inner thighs with associated clear discharge, warmth, tenderness to palpation, and erythema   -c/w Clinda 900mg q8hr for anti-toxin effect against strep for empiric gas gangrene coverage  -c/w fluconazole 200mg daily for empiric fungal coverage   -c/w zosyn 3.375g q6hr  -c/w vanc 1g q12hr. Vanc trough before 4th dose.   -f/u Testical US   -CT A/P with no signs of gas or abscess. These findings make nec fasciitis less likely.  Also demonstrate distention of the urinary bladder with a suggestion of very mild bladder wall thickening with TURP defect that may represent the sequela of bladder outlet obstruction vs. cystitis  -hepatitis panel nonreactive  -UA negative    Neuro  #Toxic Metabolic Encephalopathy: Patient acutely altered possibly due to alcohol withdrawal vs. infection vs. hyponatremia. Unlikely structural CT head negative for acute hemorrhage or infarct  -c/w alcohol withdrawal tx as below  -c/w infection tx as above  -utox negative  -hyponatremia plan as below    #Alcohol withdrawal  -c/w ativan 1mg q2hrs for CIWA >8  -c/w thiamine 500mg q8hr  -CIWA 7 (positive for tremulousness, confusion, agitation). C/w serial CIWA monitoring  -c/w D5% @200cc/hr    Renal  #Hyponatremia: Patient clinically mildly hypovolemic to euvolemic. Unclear etiology possibly related to hypovolemia (improved Na+ after 1L NS) vs. strongly considering beer potomania vs. dysregulation of ADH given patient's urine Osm low with low specific gravity and having very high free water output suggesting auto-diuresis  -Na+ 113 on presentation. Uptrending to Na+ 126 after collins placement and 1L NS  -in attempts to reverse overcorrection gave 1L D5W and started D5W @200cc/hr and gave 1 mcg of desmopresin IV  -goal Na+ by AM ~121.  -BMP q4hr    #Hypomagnesemia   -Mg 1.5 on admission. Mg 3 s/p repletion  -continue to monitor    #Hypokalemia  -K+ 3.2 on repeat BMP. s/p repletion  -f/u 8PM BMP    Pulm  #Respiratory Alkalosis: VBG demonstrating pH7.47 with pCO2 34. Likely due to initial respiratory distress from alcohol withdrawal vs. infection. Continue to monitor   -Saturations 100% on NRFM. Titrated down to NC O2 4L with saturations in %. Continue to titrate O2 as needed  -CT chest demonstrated b/l groundglass opacities that have a peripheral bronchovascular distribution possibly due to pulmonary hemorrhage and/or contusion as pattern is less typical for a multifocal pneumonia however PNA cannot be excluded. Pattern less typical for aspiration. Further, focal atelectatic change involving the posterior basal segment of the left lower lobe versus parenchymal contusion. Continue to monitor on antibx.     GI  #Transaminitis  -AST//54 - likely due to alcohol abuse given ratio >2:1. Continue to monitor  -CT A/P demonstrated hepatic steatosis  -hepatitis B/C negative    Endo  #Pressure Ulcers: Patient with b/l erythema overlying the DIP joints of toes on physical exam. Possibly 2/2 pressure ulcers   -f/u HbA1C as possibly from underlying DM    Heme  #Thrombocytopenia: Petechial rash appreciated with Plts 79 most likely due to alcoholism  -no signs of bleeding and Hb 14.5. continue to monitor    CV: No acute hemodynamic instability. Continue to monitor    F: D5 @200cc/hr  E: Replete for K<4 and Mg <2  N: NPO until S&S clears    DVT ppx: Heparin SubQ   Dispo: MICU Patient is a 63M with PMHx of alcohol abuse x30 years presented to [facility] for alcohol withdrawal was found to have b/l ecchymoses on proximal inner thighs with associated clear discharge, warmth, tenderness to palpation, and erythema possibly due to cellulitis vs. necrotizing fasciitis vs. Soni's gangrene with hyponatremia and seizure    ID  #Cellulitis vs. Necrotizing fasciitis: Patient presented with  b/l erythema on proximal inner thighs with associated clear discharge, warmth, tenderness to palpation, and erythema   -c/w Clinda 900mg q8hr for anti-toxin effect against strep for empiric gas gangrene coverage  -c/w fluconazole 200mg daily for empiric fungal coverage   -c/w zosyn 3.375g q6hr  -c/w vanc 1g q12hr. Vanc trough before 4th dose.   -f/u Testical US   -CT A/P with no signs of gas or abscess. These findings make nec fasciitis less likely.  Also demonstrate distention of the urinary bladder with a suggestion of very mild bladder wall thickening with TURP defect that may represent the sequela of bladder outlet obstruction vs. cystitis  -hepatitis panel nonreactive  -UA negative    Neuro  #Toxic Metabolic Encephalopathy: Patient acutely altered possibly due to alcohol withdrawal vs. infection vs. hyponatremia. Unlikely structural CT head negative for acute hemorrhage or infarct  -c/w alcohol withdrawal tx as below  -c/w infection tx as above  -utox negative  -hyponatremia plan as below    #Alcohol withdrawal  -c/w ativan 1mg q2hrs for CIWA >8  -c/w thiamine 500mg q8hr  -CIWA 7 (positive for tremulousness, confusion, agitation). C/w serial CIWA monitoring      Renal  #Hyponatremia: Patient clinically mildly hypovolemic to euvolemic. Unclear etiology possibly related to hypovolemia (improved Na+ after 1L NS) vs. strongly considering beer potomania vs. dysregulation of ADH given patient's urine Osm low with low specific gravity and having very high free water output suggesting auto-diuresis  -Na+ 113 on presentation. Uptrending to Na+ 126 after collins placement and 1L NS  -in attempts to reverse overcorrection gave 1L D5W and started D5W @200cc/hr and gave 1 mcg of desmopresin IV  -goal Na+ by AM ~121.  -BMP q4hr    #Hypomagnesemia   -Mg 1.5 on admission. Mg 3 s/p repletion  -continue to monitor    #Hypokalemia  -K+ 3.2 on repeat BMP. s/p repletion  -f/u 8PM BMP    Pulm  #Respiratory Alkalosis: VBG demonstrating pH7.47 with pCO2 34. Likely due to initial respiratory distress from alcohol withdrawal vs. infection. Continue to monitor   -Saturations 100% on NRFM. Titrated down to NC O2 4L with saturations in %. Continue to titrate O2 as needed  -CT chest demonstrated b/l groundglass opacities that have a peripheral bronchovascular distribution possibly due to pulmonary hemorrhage and/or contusion as pattern is less typical for a multifocal pneumonia however PNA cannot be excluded. Pattern less typical for aspiration. Further, focal atelectatic change involving the posterior basal segment of the left lower lobe versus parenchymal contusion. Continue to monitor on antibx.     GI  #Transaminitis  -AST//54 - likely due to alcohol abuse given ratio >2:1. Continue to monitor  -CT A/P demonstrated hepatic steatosis  -hepatitis B/C negative    Endo  #Pressure Ulcers: Patient with b/l erythema overlying the DIP joints of toes on physical exam. Possibly 2/2 pressure ulcers   -f/u HbA1C as possibly from underlying DM    Heme  #Thrombocytopenia: Petechial rash appreciated with Plts 79 most likely due to alcoholism  -no signs of bleeding and Hb 14.5. continue to monitor    CV: No acute hemodynamic instability. Continue to monitor    F: D5 @200cc/hr  E: Replete for K<4 and Mg <2  N: NPO until S&S clears    DVT ppx: Heparin SubQ   Dispo: MICU Patient is a 63M with PMHx of alcohol abuse x30 years presented to [facility] for alcohol withdrawal was found to have b/l erythema on proximal inner thighs with associated clear discharge, warmth, tenderness to palpation, and erythema 2/2 cellulitis. Also with hyponatremia and seizure likely due to alcohol withdrawal    ID  #Scrotal Cellulitis, b/l medial thigh cellulitis: Patient presented with  b/l erythema on proximal inner thighs with associated clear discharge, warmth, tenderness to palpation, and erythema   -scrotal skin cxs grew moderate gram positive cocci in pairs and chains, numerous mixed GNR, moderate alpha hemolytic strep, moderate Corynebacterium.  -blood cxs grew GN coccobacilli. Will repeat blood cxs in AM  -UCxs negative  -d/dequan clinda given low suspicion for nec fasciitis as CT A/P with no signs of gas or abscess.  -c/w fluconazole 200mg daily for empiric fungal coverage   -c/w zosyn 3.375g q6hr for GN coccobacilli   -c/w vanc 1g q12hr. Vanc trough @ 11AM tomorrow  -f/u Testical US   -CT A/P Also demonstrate distention of the urinary bladder with a suggestion of very mild bladder wall thickening with TURP defect that may represent the sequela of bladder outlet obstruction vs. cystitis. Unlikely cystitis given ucx and UA both negative    Neuro  #Toxic Metabolic Encephalopathy: Patient acutely altered possibly due to alcohol withdrawal vs. infection vs. hyponatremia. Unlikely structural CT head negative for acute hemorrhage or infarct  -mental status improving and now AAOX2. continue to monitor  -c/w alcohol withdrawal tx as below  -c/w infection tx as above  -utox negative  -hyponatremia plan as below    #Alcohol withdrawal  -required ativan 1mg x2 o/n for agitation. Has not required any throughout the day today.  -c/w ativan 1mg q2hrs for CIWA >8  -c/w thiamine 500mg q8hr  -c/w multivitamin  -CIWA 4 (positive for tremulousness, confusion, agitation). C/w serial CIWA monitoring    Renal  #Hyponatremia: Patient clinically mildly hypovolemic to euvolemic. Unclear etiology possibly related to hypovolemia (improved Na+ after 1L NS) vs. strongly considering beer potomania vs. dysregulation of ADH given patient's urine Osm low with low specific gravity and having very high free water output suggesting auto-diuresis.  -Na+ 113 on presentation. Uptrending to Na+ 126 after collins placement and 1L NS  -in attempts to reverse overcorrection gave 1L D5W and started D5W @200cc/hr and gave 1 mcg of desmopresin IV  -goal Na+ 128-130. Na+ at goal today  -c/w IV D5W @100cc/hr  -BMP q4hr    #Hypomagnesemia: Resolved  -Mg 1.5 on admission. Now 2.3  -continue to monitor    #Hypokalemia: resolved   -K+ 4.1 s/p repletion    Pulm  #Respiratory Alkalosis:   -VBG demonstrating pH7.47 with pCO2 34. Likely due to initial respiratory distress from alcohol withdrawal vs. infection. Continue to monitor   -Saturating 90-96% on NC O2 4L. No respiratory distress, no use of accessory muscles for inspiration. Continue to titrate O2 as needed  -CT chest demonstrated b/l groundglass opacities that have a peripheral bronchovascular distribution possibly due to pulmonary hemorrhage and/or contusion as pattern is less typical for a multifocal pneumonia however PNA cannot be excluded. Pattern less typical for aspiration. Further, focal atelectatic change involving the posterior basal segment of the left lower lobe versus parenchymal contusion. Continue to monitor on antibx.     GI  #Transaminitis: AST//54 on admission likely due to alcohol abuse given ratio >2:1  -AST/ALT downtrending to 91/41. Continue to monitor  -CT A/P demonstrated hepatic steatosis  -hepatitis B/C negative    Endo  #Pressure Ulcers: Patient with b/l erythema overlying the DIP joints of toes on physical exam. Possibly 2/2 pressure ulcers   -HbA1C 5.1.    Heme  #Thrombocytopenia: Petechial rash appreciated with Plts 79 most likely due to alcoholism  -plts downtrended to 69 this AM possibly dilutional as other cell lines also down trended s/p fluids    CV: No acute hemodynamic instability. Continue to monitor    F: D5 @100cc/hr  E: Replete for K<4 and Mg <2  N: DASH    DVT ppx: Heparin SubQ   Dispo: MICU Patient is a 63M with PMHx of alcohol abuse x30 years presented to [facility] for alcohol withdrawal was found to have b/l erythema on proximal inner thighs with associated clear discharge, warmth, tenderness to palpation, and erythema 2/2 cellulitis. Also with hyponatremia and seizure likely due to alcohol withdrawal    ID  #Scrotal Cellulitis, b/l medial thigh cellulitis: Patient presented with  b/l erythema on proximal inner thighs with associated clear discharge, warmth, tenderness to palpation, and erythema   -scrotal skin cxs grew moderate gram positive cocci in pairs and chains, numerous mixed GNR, moderate alpha hemolytic strep, moderate Corynebacterium.  -blood cxs grew GN coccobacilli. Will repeat blood cxs in AM  -UCxs negative  -d/dequan clinda given low suspicion for nec fasciitis as CT A/P with no signs of gas or abscess.  -c/w fluconazole 200mg daily for empiric fungal coverage   -c/w zosyn 3.375g q6hr for GN coccobacilli   -c/w vanc 1g q12hr. Vanc trough @ 11AM tomorrow  -f/u Testicle US   -CT A/P Also demonstrate distention of the urinary bladder with a suggestion of very mild bladder wall thickening with TURP defect that may represent the sequela of bladder outlet obstruction vs. cystitis. Unlikely cystitis given ucx and UA both negative    Neuro  #Toxic Metabolic Encephalopathy: Patient acutely altered possibly due to alcohol withdrawal vs. infection vs. hyponatremia. Unlikely structural CT head negative for acute hemorrhage or infarct  -mental status improving and now AAOX2. continue to monitor  -c/w alcohol withdrawal tx as below  -c/w infection tx as above  -utox negative  -hyponatremia plan as below    #Alcohol withdrawal  -required ativan 1mg x2 o/n for agitation. Has not required any throughout the day today.  -c/w ativan 1mg q2hrs for CIWA >8  -c/w thiamine 500mg q8hr  -c/w multivitamin  -CIWA 4 (positive for tremulousness, confusion, agitation). C/w serial CIWA monitoring    Renal  #Hyponatremia: Patient clinically mildly hypovolemic to euvolemic. Unclear etiology possibly related to hypovolemia (improved Na+ after 1L NS) vs. strongly considering beer potomania vs. dysregulation of ADH given patient's urine Osm low with low specific gravity and having very high free water output suggesting auto-diuresis.  -Na+ 113 on presentation. Uptrending to Na+ 126 after collins placement and 1L NS  -in attempts to reverse overcorrection gave 1L D5W and started D5W @200cc/hr and gave 1 mcg of desmopresin IV  -goal Na+ 128-130. Na+ at goal today  -c/w IV D5W @100cc/hr  -BMP q4hr    #Hypomagnesemia: Resolved  -Mg 1.5 on admission. Now 2.3  -continue to monitor    #Hypokalemia: resolved   -K+ 4.1 s/p repletion    Pulm  #Respiratory Alkalosis:   -VBG demonstrating pH7.47 with pCO2 34. Likely due to initial respiratory distress from alcohol withdrawal vs. infection. Continue to monitor   -Saturating 90-96% on NC O2 4L. No respiratory distress, no use of accessory muscles for inspiration. Continue to titrate O2 as needed  -CT chest demonstrated b/l groundglass opacities that have a peripheral bronchovascular distribution possibly due to pulmonary hemorrhage and/or contusion as pattern is less typical for a multifocal pneumonia however PNA cannot be excluded. Pattern less typical for aspiration. Further, focal atelectatic change involving the posterior basal segment of the left lower lobe versus parenchymal contusion. Continue to monitor on antibx.     GI  #Transaminitis: AST//54 on admission likely due to alcohol abuse given ratio >2:1  -AST/ALT downtrending to 91/41. Continue to monitor  -CT A/P demonstrated hepatic steatosis  -hepatitis B/C negative    Endo  #Pressure Ulcers: Patient with b/l erythema overlying the DIP joints of toes on physical exam. Possibly 2/2 pressure ulcers   -HbA1C 5.1.    Heme  #Thrombocytopenia: Petechial rash appreciated with Plts 79 most likely due to alcoholism  -plts downtrended to 69 this AM possibly dilutional as other cell lines also down trended s/p fluids    CV: No acute hemodynamic instability. Continue to monitor    F: D5 @100cc/hr  E: Replete for K<4 and Mg <2  N: DASH    DVT ppx: Heparin SubQ   Dispo: MICU

## 2018-09-20 NOTE — ADVANCED PRACTICE NURSE CONSULT - RECOMMEDATIONS
Severe fungal rash twice daily. Antifungal cream twice daily x 7 days.   Discussed assessment and recommendations with Eusebia OLSON Severe fungal rash twice daily. Antifungal cream twice daily x 7 days.   Discussed assessment and recommendations with RNEusebia and house staff, Dr Hargrove.

## 2018-09-20 NOTE — PROGRESS NOTE ADULT - SUBJECTIVE AND OBJECTIVE BOX
OVERNIGHT EVENTS:o/n: 9pm Na down to 124, repeat was 122, pt with bowel incontinence, nurse placed rectal tube. given ativan PRN x2 o/n given for agitation.    SUBJECTIVE / INTERVAL HPI: Patient seen and examined at bedside. Patient somnolent but arousable. Patient denies chest pain, SOB, n/v/d/c, fevers, chills, night sweats, scrotal pain, hallucinations, anxiety, headache, agitation, confusion. CIWA 4-5 for tremulousness.     VITAL SIGNS:  Vital Signs Last 24 Hrs  T(C): 37.9 (20 Sep 2018 16:46), Max: 37.9 (20 Sep 2018 16:46)  T(F): 100.3 (20 Sep 2018 16:46), Max: 100.3 (20 Sep 2018 16:46)  HR: 96 (20 Sep 2018 16:00) (70 - 112)  BP: 133/76 (20 Sep 2018 16:00) (91/55 - 139/71)  BP(mean): 94 (20 Sep 2018 16:00) (64 - 94)  RR: 61 (20 Sep 2018 16:00) (22 - 61)  SpO2: 96% (20 Sep 2018 16:00) (90% - 98%)    PHYSICAL EXAM:    General: Somnolent but arousable to verbal command. NAD. Mumbles in response to questioning.  HEENT: PERRL, anicteric sclera; Dry mucous membranes. No tongue fasciculation.   Neck: supple  Cardiovascular: +S1/S2, RRR  Respiratory: RLL crackles. No wheezing. No use of accessory muscles for inspiration on NC.  Gastrointestinal: soft, NT/ND; +BSx4  Genitourinary: Scrotal erythema present. No tenderness to palpation. Stanley draining clear yellow urine.   Extremities: Petechial rash positive especially over anterior left leg. B/l erythema over DIP joints of toes. Erythema over b/l proximal and medial thigh. No edema, no cyanosis, no tenderness. Obvious tremor with outstretched arms.   Vascular: 2+ radial, DP/PT pulses B/L  Neurological: AAOx2 (oriented to person and place); no focal deficits    MEDICATIONS:  MEDICATIONS  (STANDING):  chlorhexidine 2% Cloths 1 Application(s) Topical <User Schedule>  dextrose 5%. 1000 milliLiter(s) (200 mL/Hr) IV Continuous <Continuous>  fluconAZOLE   Tablet 200 milliGRAM(s) Oral every 24 hours  folic acid 1 milliGRAM(s) Oral daily  heparin  Injectable 5000 Unit(s) SubCutaneous every 8 hours  influenza   Vaccine 0.5 milliLiter(s) IntraMuscular once  multivitamin 1 Tablet(s) Oral daily  nystatin Powder 1 Application(s) Topical two times a day  piperacillin/tazobactam IVPB. 3.375 Gram(s) IV Intermittent every 6 hours  thiamine 100 milliGRAM(s) Oral daily  vancomycin  IVPB 1000 milliGRAM(s) IV Intermittent every 12 hours    MEDICATIONS  (PRN):  LORazepam   Injectable 1 milliGRAM(s) IV Push every 2 hours PRN CIWA >8      ALLERGIES:  Allergies    No Known Allergies    Intolerances        LABS:                        12.0   5.0   )-----------( 69       ( 20 Sep 2018 03:50 )             32.6     09-20    126<L>  |  90<L>  |  3<L>  ----------------------------<  74  3.5   |  24  |  0.53    Ca    7.8<L>      20 Sep 2018 12:52  Phos  3.0     09-20  Mg     2.3     09-20    TPro  5.3<L>  /  Alb  2.6<L>  /  TBili  1.2  /  DBili  x   /  AST  91<H>  /  ALT  41  /  AlkPhos  54  09-20    PT/INR - ( 19 Sep 2018 14:53 )   PT: 10.0 sec;   INR: 0.90          PTT - ( 19 Sep 2018 14:53 )  PTT:30.3 sec  Urinalysis Basic - ( 19 Sep 2018 15:00 )    Color: Yellow / Appearance: Clear / SG: <=1.005 / pH: x  Gluc: x / Ketone: NEGATIVE  / Bili: Negative / Urobili: 0.2 E.U./dL   Blood: x / Protein: NEGATIVE mg/dL / Nitrite: NEGATIVE   Leuk Esterase: NEGATIVE / RBC: x / WBC x   Sq Epi: x / Non Sq Epi: x / Bacteria: x      CAPILLARY BLOOD GLUCOSE OVERNIGHT EVENTS:o/n: 9pm Na down to 124, repeat was 122, pt with bowel incontinence, nurse placed rectal tube. given ativan PRN x2 o/n given for agitation.    SUBJECTIVE / INTERVAL HPI: Patient seen and examined at bedside. Patient somnolent but arousable. Patient denies chest pain, SOB, n/v/d/c, fevers, chills, night sweats, scrotal pain, hallucinations, anxiety, headache, agitation, confusion. CIWA 4-5 for tremulousness.     VITAL SIGNS:  Vital Signs Last 24 Hrs  T(C): 37.9 (20 Sep 2018 16:46), Max: 37.9 (20 Sep 2018 16:46)  T(F): 100.3 (20 Sep 2018 16:46), Max: 100.3 (20 Sep 2018 16:46)  HR: 96 (20 Sep 2018 16:00) (70 - 112)  BP: 133/76 (20 Sep 2018 16:00) (91/55 - 139/71)  BP(mean): 94 (20 Sep 2018 16:00) (64 - 94)  RR: 61 (20 Sep 2018 16:00) (22 - 61)  SpO2: 96% (20 Sep 2018 16:00) (90% - 98%)    PHYSICAL EXAM:    General: Somnolent but arousable to verbal command. NAD. Mumbles in response to questioning.  HEENT: PERRL, anicteric sclera; Dry mucous membranes. No tongue fasciculation.   Neck: supple  Cardiovascular: +S1/S2, RRR  Respiratory: RLL crackles. No wheezing. No use of accessory muscles for inspiration on NC.  Gastrointestinal: soft, NT/ND; +BSx4  Genitourinary: Scrotal erythema present. No tenderness to palpation. Stanley draining clear yellow urine.   Skin/Extremities: Petechial rash positive especially over anterior left leg. B/l erythema over DIP joints of toes. Erythema over b/l proximal and medial thigh. No edema, no cyanosis, no tenderness. Obvious tremor with outstretched arms.   Vascular: 2+ radial, DP/PT pulses B/L  Neurological: AAOx2 (oriented to person and place); no focal deficits  Psych: appears calm    MEDICATIONS:  MEDICATIONS  (STANDING):  chlorhexidine 2% Cloths 1 Application(s) Topical <User Schedule>  dextrose 5%. 1000 milliLiter(s) (200 mL/Hr) IV Continuous <Continuous>  fluconAZOLE   Tablet 200 milliGRAM(s) Oral every 24 hours  folic acid 1 milliGRAM(s) Oral daily  heparin  Injectable 5000 Unit(s) SubCutaneous every 8 hours  influenza   Vaccine 0.5 milliLiter(s) IntraMuscular once  multivitamin 1 Tablet(s) Oral daily  nystatin Powder 1 Application(s) Topical two times a day  piperacillin/tazobactam IVPB. 3.375 Gram(s) IV Intermittent every 6 hours  thiamine 100 milliGRAM(s) Oral daily  vancomycin  IVPB 1000 milliGRAM(s) IV Intermittent every 12 hours    MEDICATIONS  (PRN):  LORazepam   Injectable 1 milliGRAM(s) IV Push every 2 hours PRN CIWA >8      ALLERGIES:  Allergies    No Known Allergies    Intolerances        LABS:                        12.0   5.0   )-----------( 69       ( 20 Sep 2018 03:50 )             32.6     09-20    126<L>  |  90<L>  |  3<L>  ----------------------------<  74  3.5   |  24  |  0.53    Ca    7.8<L>      20 Sep 2018 12:52  Phos  3.0     09-20  Mg     2.3     09-20    TPro  5.3<L>  /  Alb  2.6<L>  /  TBili  1.2  /  DBili  x   /  AST  91<H>  /  ALT  41  /  AlkPhos  54  09-20    PT/INR - ( 19 Sep 2018 14:53 )   PT: 10.0 sec;   INR: 0.90          PTT - ( 19 Sep 2018 14:53 )  PTT:30.3 sec  Urinalysis Basic - ( 19 Sep 2018 15:00 )    Color: Yellow / Appearance: Clear / SG: <=1.005 / pH: x  Gluc: x / Ketone: NEGATIVE  / Bili: Negative / Urobili: 0.2 E.U./dL   Blood: x / Protein: NEGATIVE mg/dL / Nitrite: NEGATIVE   Leuk Esterase: NEGATIVE / RBC: x / WBC x   Sq Epi: x / Non Sq Epi: x / Bacteria: x      CAPILLARY BLOOD GLUCOSE

## 2018-09-21 DIAGNOSIS — E87.1 HYPO-OSMOLALITY AND HYPONATREMIA: ICD-10-CM

## 2018-09-21 DIAGNOSIS — N32.89 OTHER SPECIFIED DISORDERS OF BLADDER: ICD-10-CM

## 2018-09-21 DIAGNOSIS — D69.6 THROMBOCYTOPENIA, UNSPECIFIED: ICD-10-CM

## 2018-09-21 DIAGNOSIS — N49.2 INFLAMMATORY DISORDERS OF SCROTUM: ICD-10-CM

## 2018-09-21 DIAGNOSIS — L03.119 CELLULITIS OF UNSPECIFIED PART OF LIMB: ICD-10-CM

## 2018-09-21 DIAGNOSIS — F10.239 ALCOHOL DEPENDENCE WITH WITHDRAWAL, UNSPECIFIED: ICD-10-CM

## 2018-09-21 DIAGNOSIS — Z91.89 OTHER SPECIFIED PERSONAL RISK FACTORS, NOT ELSEWHERE CLASSIFIED: ICD-10-CM

## 2018-09-21 DIAGNOSIS — R74.0 NONSPECIFIC ELEVATION OF LEVELS OF TRANSAMINASE AND LACTIC ACID DEHYDROGENASE [LDH]: ICD-10-CM

## 2018-09-21 DIAGNOSIS — E87.3 ALKALOSIS: ICD-10-CM

## 2018-09-21 DIAGNOSIS — G92 TOXIC ENCEPHALOPATHY: ICD-10-CM

## 2018-09-21 LAB
-  AMPICILLIN: SIGNIFICANT CHANGE UP
-  TETRACYCLINE: SIGNIFICANT CHANGE UP
-  VANCOMYCIN: SIGNIFICANT CHANGE UP
ANION GAP SERPL CALC-SCNC: 11 MMOL/L — SIGNIFICANT CHANGE UP (ref 5–17)
ANION GAP SERPL CALC-SCNC: 11 MMOL/L — SIGNIFICANT CHANGE UP (ref 5–17)
ANION GAP SERPL CALC-SCNC: 12 MMOL/L — SIGNIFICANT CHANGE UP (ref 5–17)
ANION GAP SERPL CALC-SCNC: 13 MMOL/L — SIGNIFICANT CHANGE UP (ref 5–17)
BUN SERPL-MCNC: 3 MG/DL — LOW (ref 7–23)
BUN SERPL-MCNC: 3 MG/DL — LOW (ref 7–23)
BUN SERPL-MCNC: 4 MG/DL — LOW (ref 7–23)
BUN SERPL-MCNC: 4 MG/DL — LOW (ref 7–23)
CALCIUM SERPL-MCNC: 8.3 MG/DL — LOW (ref 8.4–10.5)
CALCIUM SERPL-MCNC: 8.4 MG/DL — SIGNIFICANT CHANGE UP (ref 8.4–10.5)
CALCIUM SERPL-MCNC: 8.6 MG/DL — SIGNIFICANT CHANGE UP (ref 8.4–10.5)
CALCIUM SERPL-MCNC: 8.8 MG/DL — SIGNIFICANT CHANGE UP (ref 8.4–10.5)
CHLORIDE SERPL-SCNC: 89 MMOL/L — LOW (ref 96–108)
CHLORIDE SERPL-SCNC: 90 MMOL/L — LOW (ref 96–108)
CHLORIDE SERPL-SCNC: 91 MMOL/L — LOW (ref 96–108)
CHLORIDE SERPL-SCNC: 92 MMOL/L — LOW (ref 96–108)
CO2 SERPL-SCNC: 23 MMOL/L — SIGNIFICANT CHANGE UP (ref 22–31)
CO2 SERPL-SCNC: 23 MMOL/L — SIGNIFICANT CHANGE UP (ref 22–31)
CO2 SERPL-SCNC: 24 MMOL/L — SIGNIFICANT CHANGE UP (ref 22–31)
CO2 SERPL-SCNC: 26 MMOL/L — SIGNIFICANT CHANGE UP (ref 22–31)
CREAT SERPL-MCNC: 0.52 MG/DL — SIGNIFICANT CHANGE UP (ref 0.5–1.3)
CREAT SERPL-MCNC: 0.53 MG/DL — SIGNIFICANT CHANGE UP (ref 0.5–1.3)
CREAT SERPL-MCNC: 0.55 MG/DL — SIGNIFICANT CHANGE UP (ref 0.5–1.3)
CREAT SERPL-MCNC: 0.57 MG/DL — SIGNIFICANT CHANGE UP (ref 0.5–1.3)
CULTURE RESULTS: NO GROWTH — SIGNIFICANT CHANGE UP
CULTURE RESULTS: SIGNIFICANT CHANGE UP
GLUCOSE BLDC GLUCOMTR-MCNC: 98 MG/DL — SIGNIFICANT CHANGE UP (ref 70–99)
GLUCOSE SERPL-MCNC: 104 MG/DL — HIGH (ref 70–99)
GLUCOSE SERPL-MCNC: 143 MG/DL — HIGH (ref 70–99)
GLUCOSE SERPL-MCNC: 143 MG/DL — HIGH (ref 70–99)
GLUCOSE SERPL-MCNC: 88 MG/DL — SIGNIFICANT CHANGE UP (ref 70–99)
HCT VFR BLD CALC: 34.1 % — LOW (ref 39–50)
HGB BLD-MCNC: 12.2 G/DL — LOW (ref 13–17)
MAGNESIUM SERPL-MCNC: 1.9 MG/DL — SIGNIFICANT CHANGE UP (ref 1.6–2.6)
MAGNESIUM SERPL-MCNC: 2.1 MG/DL — SIGNIFICANT CHANGE UP (ref 1.6–2.6)
MCHC RBC-ENTMCNC: 33.1 PG — SIGNIFICANT CHANGE UP (ref 27–34)
MCHC RBC-ENTMCNC: 35.8 G/DL — SIGNIFICANT CHANGE UP (ref 32–36)
MCV RBC AUTO: 92.4 FL — SIGNIFICANT CHANGE UP (ref 80–100)
METHOD TYPE: SIGNIFICANT CHANGE UP
ORGANISM # SPEC MICROSCOPIC CNT: SIGNIFICANT CHANGE UP
ORGANISM # SPEC MICROSCOPIC CNT: SIGNIFICANT CHANGE UP
PHOSPHATE SERPL-MCNC: 2.3 MG/DL — LOW (ref 2.5–4.5)
PHOSPHATE SERPL-MCNC: 2.4 MG/DL — LOW (ref 2.5–4.5)
PLATELET # BLD AUTO: 83 K/UL — LOW (ref 150–400)
POTASSIUM SERPL-MCNC: 4 MMOL/L — SIGNIFICANT CHANGE UP (ref 3.5–5.3)
POTASSIUM SERPL-MCNC: 4.1 MMOL/L — SIGNIFICANT CHANGE UP (ref 3.5–5.3)
POTASSIUM SERPL-SCNC: 4 MMOL/L — SIGNIFICANT CHANGE UP (ref 3.5–5.3)
POTASSIUM SERPL-SCNC: 4.1 MMOL/L — SIGNIFICANT CHANGE UP (ref 3.5–5.3)
RBC # BLD: 3.69 M/UL — LOW (ref 4.2–5.8)
RBC # FLD: 12.1 % — SIGNIFICANT CHANGE UP (ref 10.3–16.9)
SODIUM SERPL-SCNC: 125 MMOL/L — LOW (ref 135–145)
SODIUM SERPL-SCNC: 126 MMOL/L — LOW (ref 135–145)
SODIUM SERPL-SCNC: 127 MMOL/L — LOW (ref 135–145)
SODIUM SERPL-SCNC: 127 MMOL/L — LOW (ref 135–145)
SPECIMEN SOURCE: SIGNIFICANT CHANGE UP
SPECIMEN SOURCE: SIGNIFICANT CHANGE UP
WBC # BLD: 4.2 K/UL — SIGNIFICANT CHANGE UP (ref 3.8–10.5)
WBC # FLD AUTO: 4.2 K/UL — SIGNIFICANT CHANGE UP (ref 3.8–10.5)

## 2018-09-21 PROCEDURE — 99233 SBSQ HOSP IP/OBS HIGH 50: CPT | Mod: GC

## 2018-09-21 RX ORDER — SODIUM CHLORIDE 9 MG/ML
1000 INJECTION, SOLUTION INTRAVENOUS
Qty: 0 | Refills: 0 | Status: DISCONTINUED | OUTPATIENT
Start: 2018-09-21 | End: 2018-09-21

## 2018-09-21 RX ORDER — POTASSIUM CHLORIDE 20 MEQ
40 PACKET (EA) ORAL
Qty: 0 | Refills: 0 | Status: COMPLETED | OUTPATIENT
Start: 2018-09-21 | End: 2018-09-21

## 2018-09-21 RX ADMIN — Medication 1 MILLIGRAM(S): at 12:43

## 2018-09-21 RX ADMIN — NYSTATIN CREAM 1 APPLICATION(S): 100000 CREAM TOPICAL at 06:31

## 2018-09-21 RX ADMIN — Medication 40 MILLIEQUIVALENT(S): at 01:37

## 2018-09-21 RX ADMIN — Medication 1 TABLET(S): at 12:43

## 2018-09-21 RX ADMIN — HEPARIN SODIUM 5000 UNIT(S): 5000 INJECTION INTRAVENOUS; SUBCUTANEOUS at 06:31

## 2018-09-21 RX ADMIN — HEPARIN SODIUM 5000 UNIT(S): 5000 INJECTION INTRAVENOUS; SUBCUTANEOUS at 14:43

## 2018-09-21 RX ADMIN — Medication 40 MILLIEQUIVALENT(S): at 03:22

## 2018-09-21 RX ADMIN — PIPERACILLIN AND TAZOBACTAM 200 GRAM(S): 4; .5 INJECTION, POWDER, LYOPHILIZED, FOR SOLUTION INTRAVENOUS at 18:36

## 2018-09-21 RX ADMIN — PIPERACILLIN AND TAZOBACTAM 200 GRAM(S): 4; .5 INJECTION, POWDER, LYOPHILIZED, FOR SOLUTION INTRAVENOUS at 12:48

## 2018-09-21 RX ADMIN — PIPERACILLIN AND TAZOBACTAM 200 GRAM(S): 4; .5 INJECTION, POWDER, LYOPHILIZED, FOR SOLUTION INTRAVENOUS at 00:37

## 2018-09-21 RX ADMIN — NYSTATIN CREAM 1 APPLICATION(S): 100000 CREAM TOPICAL at 18:36

## 2018-09-21 RX ADMIN — CHLORHEXIDINE GLUCONATE 1 APPLICATION(S): 213 SOLUTION TOPICAL at 06:31

## 2018-09-21 RX ADMIN — PIPERACILLIN AND TAZOBACTAM 200 GRAM(S): 4; .5 INJECTION, POWDER, LYOPHILIZED, FOR SOLUTION INTRAVENOUS at 06:30

## 2018-09-21 RX ADMIN — Medication 166.67 MILLIGRAM(S): at 12:43

## 2018-09-21 RX ADMIN — Medication 100 MILLIGRAM(S): at 01:37

## 2018-09-21 RX ADMIN — Medication 100 MILLIGRAM(S): at 12:42

## 2018-09-21 RX ADMIN — HEPARIN SODIUM 5000 UNIT(S): 5000 INJECTION INTRAVENOUS; SUBCUTANEOUS at 22:14

## 2018-09-21 NOTE — PROGRESS NOTE ADULT - SUBJECTIVE AND OBJECTIVE BOX
PGY-1 Transfer Note From ICU to [facility]  Hospital Course:   Patient a 63 year old male with PMHx of alcohol abuse presented to [facility] due to altered mental status and alcohol withdrawal, found to have scrotal erythema, edema, and tenderness to palpation. Patient is poor historian given clinical condition and acutely altered. Patient with poor medical follow up and unclear past medical history. Per sister collateral - patient presented to [facility] after presenting to work disheveled and drunk. Patient drinks 6-10 beers per day for the past 30+ years, never hospitalized for alcohol withdrawal in the past.  Last alcoholic beverage reportedly 2 days prior to presentation. No known history of seizure related to alcohol withdrawal or hallucinations. Patient lived with parents but was forced to leave 2 weeks ago for belligerent behavior and alcohol abuse - has been living in hotel rooms and shelters since then. Of note, prior to being forced out of living arrangement with parents, patient found unconscious in bathtub from alcohol use, EMS was called at that time and was brought to a hospital ED - patient subsequently left AMA after awakening. [facility] was tremulous and weak with erythema of b/l inner thighs and scrotum with initial suspicion for cellulitis vs. necrotizing fasciitis. Also had witnessed seizure in [facility] suspected from alcohol withdrawal given ativan, supp O2, and nasal trumpet placed. Vitals in ED Temp 99.6, 174/104, , RR 20, SpO2 94% on RA. Labs significant for Na+ 113, Mg 1.5, K 3.6, AST//78, WBC 7.4, Hb 14.5. Patient given Ativan 1mg x4, IV Magnesium 4g, Vanc x1, 1L IV NS. CT head negative for acute intracranial hemorrhage or infarct. Also demonstrated distention of the urinary bladder with a suggestion of very mild bladder wall thickening with TURP defect that may represent the sequela of bladder outlet obstruction.     In [facility] patient somnolent, tremulous, CIWA 7, skin findings as noted above, and post-obstructive diuresis with collins in place- started on clinda/vanc/zosyn/fluconazole. Na+ overcorrected 126 -in attempts to reverse overcorrection gave 1L D5W and started D5W @200cc/hr and gave 1 mcg of desmopresin IV. Also with hypoK and hypoMg s/p repletion. Patient required Ativan x2 o/n on Hospital day 1 - has not required any other ativan or librium. CT A/P with no signs of gas or abscess on thighs/scrotum, BCxs grew gram negative coccobacilli, Scrotal skin cxs grew moderate gram positive cocci in pairs and chains, numerous mixed GNR, moderate alpha hemolytic strep, moderate Corynebacterium. - clinda and vanc d/dequan. Decreased D5W to 100cc/hr, Na+ trending upward and then back downward - patient with dysregulation of ADH and is currently off fluids. Surveillance cxs drawn, pending testicular US, supplemental O2 requirements down titrated as tolerated and now on RA. Patient clinically stable for step down to [facility] for continued management.    OVERNIGHT EVENTS: o/n: Vanc trough back at 6.5, gave an extra 500mg dose with 12AM dose, changed tomorrow's dose to 1250mg q12    SUBJECTIVE / INTERVAL HPI: Patient seen and examined at bedside. Patient denies fevers, chills, night sweats, abdominal pain, n/v/d/c, visual/auditory/tactile hallucinations, photosensitivity, agitation, anxiety, headache, chest pain, SOB. CIWA 0 on exam.    OVERNIGHT EVENTS:    SUBJECTIVE / INTERVAL HPI: Patient seen and examined at bedside.     VITAL SIGNS:  Vital Signs Last 24 Hrs  T(C): 37.2 (21 Sep 2018 17:31), Max: 37.7 (20 Sep 2018 20:46)  T(F): 99 (21 Sep 2018 17:31), Max: 99.8 (20 Sep 2018 20:46)  HR: 62 (21 Sep 2018 16:00) (58 - 90)  BP: 139/74 (21 Sep 2018 16:00) (104/69 - 146/82)  BP(mean): 88 (21 Sep 2018 16:00) (79 - 107)  RR: 26 (21 Sep 2018 16:00) (23 - 45)  SpO2: 97% (21 Sep 2018 16:00) (90% - 99%)    PHYSICAL EXAM:    General: Awake and alert. NAD. Eating dinner comfortably.  HEENT: PERRL, anicteric sclera; Dry mucous membranes. No tongue fasciculation.   Neck: supple  Cardiovascular: +S1/S2, RRR  Respiratory: CTAB.  No wheezing, rales, or rhonchi noted. No use of accessory muscles for inspiration on NC.  Gastrointestinal: soft, NT/ND; +BSx4  Genitourinary: Scrotal erythema present. No tenderness to palpation. Collins draining clear yellow urine.   Skin/Extremities: Petechial rash positive especially over anterior left leg. B/l erythema over DIP joints of toes. Erythema over b/l proximal and medial thigh. No edema, no cyanosis, no tenderness. Mild tremor with outstretched arms.   Vascular: 2+ radial, DP/PT pulses B/L  Neurological: AAOx2 (oriented to person & place); read date off board, no focal deficits  Psych: Calm, answers questions appropriately      MEDICATIONS:  MEDICATIONS  (STANDING):  chlorhexidine 2% Cloths 1 Application(s) Topical <User Schedule>  fluconAZOLE   Tablet 200 milliGRAM(s) Oral every 24 hours  folic acid 1 milliGRAM(s) Oral daily  heparin  Injectable 5000 Unit(s) SubCutaneous every 8 hours  influenza   Vaccine 0.5 milliLiter(s) IntraMuscular once  multivitamin 1 Tablet(s) Oral daily  nystatin Powder 1 Application(s) Topical two times a day  piperacillin/tazobactam IVPB. 3.375 Gram(s) IV Intermittent every 6 hours  thiamine 100 milliGRAM(s) Oral daily    MEDICATIONS  (PRN):  LORazepam   Injectable 1 milliGRAM(s) IV Push every 2 hours PRN CIWA >8      ALLERGIES:  Allergies    No Known Allergies    Intolerances        LABS:                        12.2   4.2   )-----------( 83       ( 21 Sep 2018 05:07 )             34.1     09-21    127<L>  |  92<L>  |  4<L>  ----------------------------<  143<H>  4.0   |  24  |  0.55    Ca    8.8      21 Sep 2018 13:28  Phos  2.4     09-21  Mg     1.9     09-21    TPro  5.3<L>  /  Alb  2.6<L>  /  TBili  1.2  /  DBili  x   /  AST  91<H>  /  ALT  41  /  AlkPhos  54  09-20        CAPILLARY BLOOD GLUCOSE      POCT Blood Glucose.: 98 mg/dL (21 Sep 2018 11:27)      RADIOLOGY & ADDITIONAL TESTS: Reviewed.    ASSESSMENT:    PLAN: PGY-1 Transfer Note From ICU to [facility]  Hospital Course:   Patient a 63 year old male with PMHx of alcohol abuse presented to [facility] due to altered mental status and alcohol withdrawal, found to have scrotal erythema, edema, and tenderness to palpation. Patient is poor historian given clinical condition and acutely altered. Patient with poor medical follow up and unclear past medical history. Per sister collateral - patient presented to [facility] after presenting to work disheveled and drunk. Patient drinks 6-10 beers per day for the past 30+ years, never hospitalized for alcohol withdrawal in the past.  Last alcoholic beverage reportedly 2 days prior to presentation. No known history of seizure related to alcohol withdrawal or hallucinations. Patient lived with parents but was forced to leave 2 weeks ago for belligerent behavior and alcohol abuse - has been living in hotel rooms and shelters since then. Of note, prior to being forced out of living arrangement with parents, patient found unconscious in bathtub from alcohol use, EMS was called at that time and was brought to a hospital ED - patient subsequently left AMA after awakening. [facility] trem[facility] and weak with erythema of b/l inner thighs and scrotum with initial suspicion for cellulitis vs. necrotizing fasciitis. Also had witnessed seizure in [facility] suspected from alcohol withdrawal given ativan, supp O2, and nasal trumpet placed. Vitals in ED Temp 99.6, 174/104, , RR 20, SpO2 94% on RA. Labs significant for Na+ 113, Mg 1.5, K 3.6, AST//78, WBC 7.4, Hb 14.5. Patient given Ativan 1mg x4, IV Magnesium 4g, Vanc x1, 1L IV NS. CT head negative for acute intracranial hemorrhage or infarct. Also demonstrated distention of the urinary bladder with a suggestion of very mild bladder wall thickening with TURP defect that may represent the sequela of bladder outlet obstruction.     In [facility] patient somnolent, tremulous, CIWA 7, skin findings as noted above, and post-obstructive diuresis with collins in place- started on clinda/vanc/zosyn/fluconazole. Na+ overcorrected 126 -in attempts to reverse overcorrection gave 1L D5W and started D5W @200cc/hr and gave 1 mcg of desmopresin IV. Also with hypoK and hypoMg s/p repletion. Patient required Ativan x2 o/n on Hospital day 1 - has not required any other ativan or librium. CT A/P with no signs of gas or abscess on thighs/scrotum, BCxs grew gram negative coccobacilli, Scrotal skin cxs grew moderate gram positive cocci in pairs and chains, numerous mixed GNR, moderate alpha hemolytic strep, moderate Corynebacterium. - clinda and vanc d/dequan. Decreased D5W to 100cc/hr, Na+ trending upward and then back downward - patient with dysregulation of ADH and is currently off fluids. Surveillance cxs drawn, pending testicular US, supplemental O2 requirements down titrated as tolerated and now on RA. Patient clinically stable for step down to [facility] for continued management.    OVERNIGHT EVENTS: o/n: Vanc trough back at 6.5, gave an extra 500mg dose with 12AM dose.  Turned off D5.    SUBJECTIVE / INTERVAL HPI: Patient seen and examined at bedside. Patient denies fevers, chills, night sweats, abdominal pain, n/v/d/c, visual/auditory/tactile hallucinations, photosensitivity, agitation, anxiety, headache, chest pain, SOB. CIWA 0 on exam.    OVERNIGHT EVENTS:    SUBJECTIVE / INTERVAL HPI: Patient seen and examined at bedside.     VITAL SIGNS:  Vital Signs Last 24 Hrs  T(C): 37.2 (21 Sep 2018 17:31), Max: 37.7 (20 Sep 2018 20:46)  T(F): 99 (21 Sep 2018 17:31), Max: 99.8 (20 Sep 2018 20:46)  HR: 62 (21 Sep 2018 16:00) (58 - 90)  BP: 139/74 (21 Sep 2018 16:00) (104/69 - 146/82)  BP(mean): 88 (21 Sep 2018 16:00) (79 - 107)  RR: 26 (21 Sep 2018 16:00) (23 - 45)  SpO2: 97% (21 Sep 2018 16:00) (90% - 99%)    PHYSICAL EXAM:    General: Awake and alert. NAD. Eating dinner comfortably.  HEENT: PERRL, anicteric sclera; Dry mucous membranes. No tongue fasciculation.   Neck: supple  Cardiovascular: +S1/S2, RRR  Respiratory: CTAB.  No wheezing, rales, or rhonchi noted. No use of accessory muscles for inspiration on NC.  Gastrointestinal: soft, NT/ND; +BSx4  Genitourinary: Scrotal erythema present. No tenderness to palpation. Collins draining clear yellow urine.   Skin/Extremities: Petechial rash positive especially over anterior left leg. B/l erythema over DIP joints of toes. Erythema over b/l proximal and medial thigh. No edema, no cyanosis, no tenderness. No noted crepitus on inner thighs.  Vascular: 2+ radial, DP/PT pulses B/L  Neurological: AAOx2 (oriented to person & place); read date off board, no focal deficits  Psych: Calm, answers questions appropriately      MEDICATIONS:  MEDICATIONS  (STANDING):  chlorhexidine 2% Cloths 1 Application(s) Topical <User Schedule>  fluconAZOLE   Tablet 200 milliGRAM(s) Oral every 24 hours  folic acid 1 milliGRAM(s) Oral daily  heparin  Injectable 5000 Unit(s) SubCutaneous every 8 hours  influenza   Vaccine 0.5 milliLiter(s) IntraMuscular once  multivitamin 1 Tablet(s) Oral daily  nystatin Powder 1 Application(s) Topical two times a day  piperacillin/tazobactam IVPB. 3.375 Gram(s) IV Intermittent every 6 hours  thiamine 100 milliGRAM(s) Oral daily    MEDICATIONS  (PRN):  LORazepam   Injectable 1 milliGRAM(s) IV Push every 2 hours PRN CIWA >8      ALLERGIES:  Allergies    No Known Allergies    Intolerances        LABS:                        12.2   4.2   )-----------( 83       ( 21 Sep 2018 05:07 )             34.1     09-21    127<L>  |  92<L>  |  4<L>  ----------------------------<  143<H>  4.0   |  24  |  0.55    Ca    8.8      21 Sep 2018 13:28  Phos  2.4     09-21  Mg     1.9     09-21    TPro  5.3<L>  /  Alb  2.6<L>  /  TBili  1.2  /  DBili  x   /  AST  91<H>  /  ALT  41  /  AlkPhos  54  09-20        CAPILLARY BLOOD GLUCOSE      POCT Blood Glucose.: 98 mg/dL (21 Sep 2018 11:27)      RADIOLOGY & ADDITIONAL TESTS: Reviewed.    ASSESSMENT:    PLAN:

## 2018-09-21 NOTE — PROGRESS NOTE ADULT - PROBLEM SELECTOR PLAN 4
Patient acutely altered possibly due to alcohol withdrawal vs. infection vs. hyponatremia. Unlikely structural CT head negative for acute hemorrhage or infarct  -mental status improving, AAOX2/3. continue to monitor  -CIWAs have been 0  -c/w infection tx as above  -utox negative  -hyponatremia plan as below -Last ativan requirement for withdrawal symptoms was approximately 36 hrs ago  -c/w ativan 1mg q2hrs for CIWA >8  - c/w thiamine 500mg q8hr until completion of 9 doses  - c/w multivitamin  -CIWA currently 0.  - C/w serial CIWA monitoring

## 2018-09-21 NOTE — PROGRESS NOTE ADULT - SUBJECTIVE AND OBJECTIVE BOX
OVERNIGHT EVENTS: o/n: Vanc trough back at 6.5, gave an extra 500mg dose with 12AM dose, changed tomorrow's dose to 1250mg q12    SUBJECTIVE / INTERVAL HPI: Patient seen and examined at bedside.     VITAL SIGNS:  Vital Signs Last 24 Hrs  T(C): 37.1 (21 Sep 2018 01:46), Max: 37.9 (20 Sep 2018 16:46)  T(F): 98.7 (21 Sep 2018 01:46), Max: 100.3 (20 Sep 2018 16:46)  HR: 64 (21 Sep 2018 05:00) (64 - 96)  BP: 125/81 (21 Sep 2018 05:00) (91/55 - 140/75)  BP(mean): 92 (21 Sep 2018 05:00) (64 - 105)  RR: 35 (21 Sep 2018 05:00) (25 - 61)  SpO2: 95% (21 Sep 2018 05:00) (90% - 98%)    PHYSICAL EXAM:    General: Somnolent but arousable to verbal command. NAD. Mumbles in response to questioning.  HEENT: PERRL, anicteric sclera; Dry mucous membranes. No tongue fasciculation.   Neck: supple  Cardiovascular: +S1/S2, RRR  Respiratory: RLL crackles. No wheezing. No use of accessory muscles for inspiration on NC.  Gastrointestinal: soft, NT/ND; +BSx4  Genitourinary: Scrotal erythema present. No tenderness to palpation. Stanley draining clear yellow urine.   Skin/Extremities: Petechial rash positive especially over anterior left leg. B/l erythema over DIP joints of toes. Erythema over b/l proximal and medial thigh. No edema, no cyanosis, no tenderness. Obvious tremor with outstretched arms.   Vascular: 2+ radial, DP/PT pulses B/L  Neurological: AAOx2 (oriented to person and place); no focal deficits  Psych: appears calm    MEDICATIONS:  MEDICATIONS  (STANDING):  chlorhexidine 2% Cloths 1 Application(s) Topical <User Schedule>  dextrose 5%. 1000 milliLiter(s) (100 mL/Hr) IV Continuous <Continuous>  fluconAZOLE   Tablet 200 milliGRAM(s) Oral every 24 hours  folic acid 1 milliGRAM(s) Oral daily  heparin  Injectable 5000 Unit(s) SubCutaneous every 8 hours  influenza   Vaccine 0.5 milliLiter(s) IntraMuscular once  multivitamin 1 Tablet(s) Oral daily  nystatin Powder 1 Application(s) Topical two times a day  piperacillin/tazobactam IVPB. 3.375 Gram(s) IV Intermittent every 6 hours  thiamine 100 milliGRAM(s) Oral daily  vancomycin  IVPB 1250 milliGRAM(s) IV Intermittent every 12 hours    MEDICATIONS  (PRN):  LORazepam   Injectable 1 milliGRAM(s) IV Push every 2 hours PRN CIWA >8      ALLERGIES:  Allergies    No Known Allergies    Intolerances        LABS:                        12.2   4.2   )-----------( 83       ( 21 Sep 2018 05:07 )             34.1     09-20    128<L>  |  92<L>  |  4<L>  ----------------------------<  81  3.4<L>   |  25  |  0.52    Ca    7.9<L>      20 Sep 2018 22:24  Phos  3.0     09-20  Mg     2.3     09-20    TPro  5.3<L>  /  Alb  2.6<L>  /  TBili  1.2  /  DBili  x   /  AST  91<H>  /  ALT  41  /  AlkPhos  54  09-20    PT/INR - ( 19 Sep 2018 14:53 )   PT: 10.0 sec;   INR: 0.90          PTT - ( 19 Sep 2018 14:53 )  PTT:30.3 sec  Urinalysis Basic - ( 19 Sep 2018 15:00 )    Color: Yellow / Appearance: Clear / SG: <=1.005 / pH: x  Gluc: x / Ketone: NEGATIVE  / Bili: Negative / Urobili: 0.2 E.U./dL   Blood: x / Protein: NEGATIVE mg/dL / Nitrite: NEGATIVE   Leuk Esterase: NEGATIVE / RBC: x / WBC x   Sq Epi: x / Non Sq Epi: x / Bacteria: x      CAPILLARY BLOOD GLUCOSE          RADIOLOGY & ADDITIONAL TESTS: Reviewed. Transfer Note  MICU to [facility]    Hospital Course  Patient a 63year old male with PMHx of alcohol abuse presented to [facility] due to altered mental status and alcohol withdrawal, found to have scrotal erythema, edema, and tenderness to palpation. Patient is poor historian given clinical condition and acutely altered. Patient with poor medical follow up and unclear past medical history. Per sister collateral - patient presented to [facility] after presenting to work disheveled and drunk. Patient drinks 6-10 beers per day for the past 30+ years, never hospitalized for alcohol withdrawal in the past. No known history of seizure related to alcohol withdrawal or hallucinations. Patient lived with parents but was forced to leave 2 weeks ago for belligerent behavior and alcohol abuse - has been living in hotel rooms and shelters since then. Of note, prior to being forced out of living arrangement with parents, patient found unconscious in bathtub from alcohol use, EMS was called at that time and was brought to a hospital ED - patient subsequently left AMA after awakening. [facility] and weak with erythema of b/l inner thighs and scrotum with initial suspicion for cellulitis vs. necrotizing fasciitis. Also had witnessed seizure in [facility] suspected from alcohol withdrawal given ativan, supp O2, and nasal trumpet placed. Last alcoholic beverage reportedly 2 days prior to presentation. Vitals in ED Temp 99.6, 174/104, , RR 20, SpO2 94% on RA. Labs significant for Na+ 113, Mg 1.5, K 3.6, AST//78, WBC 7.4, Hb 14.5. Patient given Ativan 1mg x4, IV Magnesium 4g, Vanc x1, 1L IV NS. CT head negative for acute intracranial hemorrhage or infarct.  Also demonstrated distention of the urinary bladder with a suggestion of very mild bladder wall thickening with TURP defect that may represent the sequela of bladder outlet obstruction vs. cystitis. In [facility] patient started on clinda/vanc/zosyn/fluconazole. CT A/P with no signs of gas or abscess on thighs/scrotum and BCxs grew gram negative coccobacilli.      OVERNIGHT EVENTS: o/n: Vanc trough back at 6.5, gave an extra 500mg dose with 12AM dose, changed tomorrow's dose to 1250mg q12    SUBJECTIVE / INTERVAL HPI: Patient seen and examined at bedside. Patient denies fevers, chills, night sweats, abdominal pain, n/v/d/c, visual/auditory/tactile hallucinations, photosensitivity, agitation, anxiety, headache, chest pain, SOB.     VITAL SIGNS:  Vital Signs Last 24 Hrs  T(C): 37.1 (21 Sep 2018 01:46), Max: 37.9 (20 Sep 2018 16:46)  T(F): 98.7 (21 Sep 2018 01:46), Max: 100.3 (20 Sep 2018 16:46)  HR: 64 (21 Sep 2018 05:00) (64 - 96)  BP: 125/81 (21 Sep 2018 05:00) (91/55 - 140/75)  BP(mean): 92 (21 Sep 2018 05:00) (64 - 105)  RR: 35 (21 Sep 2018 05:00) (25 - 61)  SpO2: 95% (21 Sep 2018 05:00) (90% - 98%)    PHYSICAL EXAM:    General: Somnolent but arousable to verbal command. NAD. Mumbles in response to questioning.  HEENT: PERRL, anicteric sclera; Dry mucous membranes. No tongue fasciculation.   Neck: supple  Cardiovascular: +S1/S2, RRR  Respiratory: RLL crackles. No wheezing. No use of accessory muscles for inspiration on NC.  Gastrointestinal: soft, NT/ND; +BSx4  Genitourinary: Scrotal erythema present. No tenderness to palpation. Stanley draining clear yellow urine.   Skin/Extremities: Petechial rash positive especially over anterior left leg. B/l erythema over DIP joints of toes. Erythema over b/l proximal and medial thigh. No edema, no cyanosis, no tenderness. Obvious tremor with outstretched arms.   Vascular: 2+ radial, DP/PT pulses B/L  Neurological: AAOx2 (oriented to person and place); no focal deficits  Psych: appears calm    MEDICATIONS:  MEDICATIONS  (STANDING):  chlorhexidine 2% Cloths 1 Application(s) Topical <User Schedule>  dextrose 5%. 1000 milliLiter(s) (100 mL/Hr) IV Continuous <Continuous>  fluconAZOLE   Tablet 200 milliGRAM(s) Oral every 24 hours  folic acid 1 milliGRAM(s) Oral daily  heparin  Injectable 5000 Unit(s) SubCutaneous every 8 hours  influenza   Vaccine 0.5 milliLiter(s) IntraMuscular once  multivitamin 1 Tablet(s) Oral daily  nystatin Powder 1 Application(s) Topical two times a day  piperacillin/tazobactam IVPB. 3.375 Gram(s) IV Intermittent every 6 hours  thiamine 100 milliGRAM(s) Oral daily  vancomycin  IVPB 1250 milliGRAM(s) IV Intermittent every 12 hours    MEDICATIONS  (PRN):  LORazepam   Injectable 1 milliGRAM(s) IV Push every 2 hours PRN CIWA >8      ALLERGIES:  Allergies    No Known Allergies    Intolerances        LABS:                        12.2   4.2   )-----------( 83       ( 21 Sep 2018 05:07 )             34.1     09-20    128<L>  |  92<L>  |  4<L>  ----------------------------<  81  3.4<L>   |  25  |  0.52    Ca    7.9<L>      20 Sep 2018 22:24  Phos  3.0     09-20  Mg     2.3     09-20    TPro  5.3<L>  /  Alb  2.6<L>  /  TBili  1.2  /  DBili  x   /  AST  91<H>  /  ALT  41  /  AlkPhos  54  09-20    PT/INR - ( 19 Sep 2018 14:53 )   PT: 10.0 sec;   INR: 0.90          PTT - ( 19 Sep 2018 14:53 )  PTT:30.3 sec  Urinalysis Basic - ( 19 Sep 2018 15:00 )    Color: Yellow / Appearance: Clear / SG: <=1.005 / pH: x  Gluc: x / Ketone: NEGATIVE  / Bili: Negative / Urobili: 0.2 E.U./dL   Blood: x / Protein: NEGATIVE mg/dL / Nitrite: NEGATIVE   Leuk Esterase: NEGATIVE / RBC: x / WBC x   Sq Epi: x / Non Sq Epi: x / Bacteria: x      CAPILLARY BLOOD GLUCOSE          RADIOLOGY & ADDITIONAL TESTS: Reviewed. Transfer Note  MICU to [facility]    Hospital Course  Patient a 63year old male with PMHx of alcohol abuse presented to [facility] due to altered mental status and alcohol withdrawal, found to have scrotal erythema, edema, and tenderness to palpation. Patient is poor historian given clinical condition and acutely altered. Patient with poor medical follow up and unclear past medical history. Per sister collateral - patient presented to [facility] after presenting to work disheveled and drunk. Patient drinks 6-10 beers per day for the past 30+ years, never hospitalized for alcohol withdrawal in the past.  Last alcoholic beverage reportedly 2 days prior to presentation. No known history of seizure related to alcohol withdrawal or hallucinations. Patient lived with parents but was forced to leave 2 weeks ago for belligerent behavior and alcohol abuse - has been living in hotel rooms and shelters since then. Of note, prior to being forced out of living arrangement with parents, patient found unconscious in bathtub from alcohol use, EMS was called at that time and was brought to a hospital ED - patient subsequently left AMA after awakening. [facility] and weak with erythema of b/l inner thighs and scrotum with initial suspicion for cellulitis vs. necrotizing fasciitis. Also had witnessed seizure in [facility] suspected from alcohol withdrawal given ativan, supp O2, and nasal trumpet placed. Vitals in ED Temp 99.6, 174/104, , RR 20, SpO2 94% on RA. Labs significant for Na+ 113, Mg 1.5, K 3.6, AST//78, WBC 7.4, Hb 14.5. Patient given Ativan 1mg x4, IV Magnesium 4g, Vanc x1, 1L IV NS. CT head negative for acute intracranial hemorrhage or infarct. Also demonstrated distention of the urinary bladder with a suggestion of very mild bladder wall thickening with TURP defect that may represent the sequela of bladder outlet obstruction.     In [facility] patient somnolent, tremulous, CIWA 7, skin findings as noted above, and post-obstructive diuresis with collins in place- started on clinda/vanc/zosyn/fluconazole. Na+ overcorrected 126 -in attempts to reverse overcorrection gave 1L D5W and started D5W @200cc/hr and gave 1 mcg of desmopresin IV. Also with hypoK and hypoMg s/p repletion. Patient required Ativan x2 o/n on Hospital day 1 - has not required any other ativan or librium. CT A/P with no signs of gas or abscess on thighs/scrotum, BCxs grew gram negative coccobacilli, Scrotal skin cxs grew moderate gram positive cocci in pairs and chains, numerous mixed GNR, moderate alpha hemolytic strep, moderate Corynebacterium. - clinda and vanc d/dequan. Decreased D5W to 100cc/hr, Na+ trending upward and then back downward - patient with dysregulation of ADH and is currently off fluids. Surveillance cxs drawn, pending testicular US, supplemental O2 requirements down titrated as tolerated and now on RA. Patient clinically stable for step down to [facility] for continued management.    OVERNIGHT EVENTS: o/n: Vanc trough back at 6.5, gave an extra 500mg dose with 12AM dose, changed tomorrow's dose to 1250mg q12    SUBJECTIVE / INTERVAL HPI: Patient seen and examined at bedside. Patient denies fevers, chills, night sweats, abdominal pain, n/v/d/c, visual/auditory/tactile hallucinations, photosensitivity, agitation, anxiety, headache, chest pain, SOB.     VITAL SIGNS:  Vital Signs Last 24 Hrs  T(C): 37.1 (21 Sep 2018 01:46), Max: 37.9 (20 Sep 2018 16:46)  T(F): 98.7 (21 Sep 2018 01:46), Max: 100.3 (20 Sep 2018 16:46)  HR: 64 (21 Sep 2018 05:00) (64 - 96)  BP: 125/81 (21 Sep 2018 05:00) (91/55 - 140/75)  BP(mean): 92 (21 Sep 2018 05:00) (64 - 105)  RR: 35 (21 Sep 2018 05:00) (25 - 61)  SpO2: 95% (21 Sep 2018 05:00) (90% - 98%)    PHYSICAL EXAM:    General: Somnolent but arousable to verbal command. NAD. Mumbles in response to questioning.  HEENT: PERRL, anicteric sclera; Dry mucous membranes. No tongue fasciculation.   Neck: supple  Cardiovascular: +S1/S2, RRR  Respiratory: RLL crackles. No wheezing. No use of accessory muscles for inspiration on NC.  Gastrointestinal: soft, NT/ND; +BSx4  Genitourinary: Scrotal erythema present. No tenderness to palpation. Collins draining clear yellow urine.   Skin/Extremities: Petechial rash positive especially over anterior left leg. B/l erythema over DIP joints of toes. Erythema over b/l proximal and medial thigh. No edema, no cyanosis, no tenderness. Obvious tremor with outstretched arms.   Vascular: 2+ radial, DP/PT pulses B/L  Neurological: AAOx2 (oriented to person and place); no focal deficits  Psych: appears calm    MEDICATIONS:  MEDICATIONS  (STANDING):  chlorhexidine 2% Cloths 1 Application(s) Topical <User Schedule>  dextrose 5%. 1000 milliLiter(s) (100 mL/Hr) IV Continuous <Continuous>  fluconAZOLE   Tablet 200 milliGRAM(s) Oral every 24 hours  folic acid 1 milliGRAM(s) Oral daily  heparin  Injectable 5000 Unit(s) SubCutaneous every 8 hours  influenza   Vaccine 0.5 milliLiter(s) IntraMuscular once  multivitamin 1 Tablet(s) Oral daily  nystatin Powder 1 Application(s) Topical two times a day  piperacillin/tazobactam IVPB. 3.375 Gram(s) IV Intermittent every 6 hours  thiamine 100 milliGRAM(s) Oral daily  vancomycin  IVPB 1250 milliGRAM(s) IV Intermittent every 12 hours    MEDICATIONS  (PRN):  LORazepam   Injectable 1 milliGRAM(s) IV Push every 2 hours PRN CIWA >8      ALLERGIES:  Allergies    No Known Allergies    Intolerances        LABS:                        12.2   4.2   )-----------( 83       ( 21 Sep 2018 05:07 )             34.1     09-20    128<L>  |  92<L>  |  4<L>  ----------------------------<  81  3.4<L>   |  25  |  0.52    Ca    7.9<L>      20 Sep 2018 22:24  Phos  3.0     09-20  Mg     2.3     09-20    TPro  5.3<L>  /  Alb  2.6<L>  /  TBili  1.2  /  DBili  x   /  AST  91<H>  /  ALT  41  /  AlkPhos  54  09-20    PT/INR - ( 19 Sep 2018 14:53 )   PT: 10.0 sec;   INR: 0.90          PTT - ( 19 Sep 2018 14:53 )  PTT:30.3 sec  Urinalysis Basic - ( 19 Sep 2018 15:00 )    Color: Yellow / Appearance: Clear / SG: <=1.005 / pH: x  Gluc: x / Ketone: NEGATIVE  / Bili: Negative / Urobili: 0.2 E.U./dL   Blood: x / Protein: NEGATIVE mg/dL / Nitrite: NEGATIVE   Leuk Esterase: NEGATIVE / RBC: x / WBC x   Sq Epi: x / Non Sq Epi: x / Bacteria: x      CAPILLARY BLOOD GLUCOSE          RADIOLOGY & ADDITIONAL TESTS: Reviewed. Transfer Note  MICU to [facility]    Hospital Course  Patient a 63year old male with PMHx of alcohol abuse presented to [facility] due to altered mental status and alcohol withdrawal, found to have scrotal erythema, edema, and tenderness to palpation. Patient is poor historian given clinical condition and acutely altered. Patient with poor medical follow up and unclear past medical history. Per sister collateral - patient presented to [facility] after presenting to work disheveled and drunk. Patient drinks 6-10 beers per day for the past 30+ years, never hospitalized for alcohol withdrawal in the past.  Last alcoholic beverage reportedly 2 days prior to presentation. No known history of seizure related to alcohol withdrawal or hallucinations. Patient lived with parents but was forced to leave 2 weeks ago for belligerent behavior and alcohol abuse - has been living in hotel rooms and shelters since then. Of note, prior to being forced out of living arrangement with parents, patient found unconscious in bathtub from alcohol use, EMS was called at that time and was brought to a hospital ED - patient subsequently left AMA after awakening. [facility] was tremulous and weak with erythema of b/l inner thighs and scrotum with initial suspicion for cellulitis vs. necrotizing fasciitis. Also had witnessed seizure in [facility] suspected from alcohol withdrawal given ativan, supp O2, and nasal trumpet placed. Vitals in ED Temp 99.6, 174/104, , RR 20, SpO2 94% on RA. Labs significant for Na+ 113, Mg 1.5, K 3.6, AST//78, WBC 7.4, Hb 14.5. Patient given Ativan 1mg x4, IV Magnesium 4g, Vanc x1, 1L IV NS. CT head negative for acute intracranial hemorrhage or infarct. Also demonstrated distention of the urinary bladder with a suggestion of very mild bladder wall thickening with TURP defect that may represent the sequela of bladder outlet obstruction.     In [facility] patient somnolent, tremulous, CIWA 7, skin findings as noted above, and post-obstructive diuresis with collins in place- started on clinda/vanc/zosyn/fluconazole. Na+ overcorrected 126 -in attempts to reverse overcorrection gave 1L D5W and started D5W @200cc/hr and gave 1 mcg of desmopresin IV. Also with hypoK and hypoMg s/p repletion. Patient required Ativan x2 o/n on Hospital day 1 - has not required any other ativan or librium. CT A/P with no signs of gas or abscess on thighs/scrotum, BCxs grew gram negative coccobacilli, Scrotal skin cxs grew moderate gram positive cocci in pairs and chains, numerous mixed GNR, moderate alpha hemolytic strep, moderate Corynebacterium. - clinda and vanc d/dequan. Decreased D5W to 100cc/hr, Na+ trending upward and then back downward - patient with dysregulation of ADH and is currently off fluids. Surveillance cxs drawn, pending testicular US, supplemental O2 requirements down titrated as tolerated and now on RA. Patient clinically stable for step down to [facility] for continued management.    OVERNIGHT EVENTS: o/n: Vanc trough back at 6.5, gave an extra 500mg dose with 12AM dose, changed tomorrow's dose to 1250mg q12    SUBJECTIVE / INTERVAL HPI: Patient seen and examined at bedside. Patient denies fevers, chills, night sweats, abdominal pain, n/v/d/c, visual/auditory/tactile hallucinations, photosensitivity, agitation, anxiety, headache, chest pain, SOB. CIWA 1 for tremor    VITAL SIGNS:  Vital Signs Last 24 Hrs  T(C): 37.1 (21 Sep 2018 01:46), Max: 37.9 (20 Sep 2018 16:46)  T(F): 98.7 (21 Sep 2018 01:46), Max: 100.3 (20 Sep 2018 16:46)  HR: 64 (21 Sep 2018 05:00) (64 - 96)  BP: 125/81 (21 Sep 2018 05:00) (91/55 - 140/75)  BP(mean): 92 (21 Sep 2018 05:00) (64 - 105)  RR: 35 (21 Sep 2018 05:00) (25 - 61)  SpO2: 95% (21 Sep 2018 05:00) (90% - 98%)    PHYSICAL EXAM:    General: Awake and alert. NAD. Speaking more clearly today.  HEENT: PERRL, anicteric sclera; Dry mucous membranes. No tongue fasciculation.   Neck: supple  Cardiovascular: +S1/S2, RRR  Respiratory: RLL crackles. No wheezing. No use of accessory muscles for inspiration on NC.  Gastrointestinal: soft, NT/ND; +BSx4  Genitourinary: Scrotal erythema present - improvement from yesterday. No tenderness to palpation. Collins draining clear yellow urine.   Skin/Extremities: Petechial rash positive especially over anterior left leg. B/l erythema over DIP joints of toes. Erythema over b/l proximal and medial thigh. No edema, no cyanosis, no tenderness. Mild tremor with outstretched arms.   Vascular: 2+ radial, DP/PT pulses B/L  Neurological: AAOx2 (oriented to person and place); no focal deficits  Psych: appears calm    MEDICATIONS:  MEDICATIONS  (STANDING):  chlorhexidine 2% Cloths 1 Application(s) Topical <User Schedule>  dextrose 5%. 1000 milliLiter(s) (100 mL/Hr) IV Continuous <Continuous>  fluconAZOLE   Tablet 200 milliGRAM(s) Oral every 24 hours  folic acid 1 milliGRAM(s) Oral daily  heparin  Injectable 5000 Unit(s) SubCutaneous every 8 hours  influenza   Vaccine 0.5 milliLiter(s) IntraMuscular once  multivitamin 1 Tablet(s) Oral daily  nystatin Powder 1 Application(s) Topical two times a day  piperacillin/tazobactam IVPB. 3.375 Gram(s) IV Intermittent every 6 hours  thiamine 100 milliGRAM(s) Oral daily  vancomycin  IVPB 1250 milliGRAM(s) IV Intermittent every 12 hours    MEDICATIONS  (PRN):  LORazepam   Injectable 1 milliGRAM(s) IV Push every 2 hours PRN CIWA >8      ALLERGIES:  Allergies    No Known Allergies    Intolerances        LABS:                        12.2   4.2   )-----------( 83       ( 21 Sep 2018 05:07 )             34.1     09-20    128<L>  |  92<L>  |  4<L>  ----------------------------<  81  3.4<L>   |  25  |  0.52    Ca    7.9<L>      20 Sep 2018 22:24  Phos  3.0     09-20  Mg     2.3     09-20    TPro  5.3<L>  /  Alb  2.6<L>  /  TBili  1.2  /  DBili  x   /  AST  91<H>  /  ALT  41  /  AlkPhos  54  09-20    PT/INR - ( 19 Sep 2018 14:53 )   PT: 10.0 sec;   INR: 0.90          PTT - ( 19 Sep 2018 14:53 )  PTT:30.3 sec  Urinalysis Basic - ( 19 Sep 2018 15:00 )    Color: Yellow / Appearance: Clear / SG: <=1.005 / pH: x  Gluc: x / Ketone: NEGATIVE  / Bili: Negative / Urobili: 0.2 E.U./dL   Blood: x / Protein: NEGATIVE mg/dL / Nitrite: NEGATIVE   Leuk Esterase: NEGATIVE / RBC: x / WBC x   Sq Epi: x / Non Sq Epi: x / Bacteria: x      CAPILLARY BLOOD GLUCOSE          RADIOLOGY & ADDITIONAL TESTS: Reviewed.

## 2018-09-21 NOTE — PROGRESS NOTE ADULT - PROBLEM SELECTOR PLAN 8
#Transaminitis: AST//54 on admission likely due to alcohol abuse given ratio >2:1  -AST/ALT downtrending to 91/41  -CT A/P demonstrated hepatic steatosis  -hepatitis B/C negative Originally, petechial rash appreciated with Plts 79 most likely due to alcoholism  -plts 83 this AM  -continue to monitor

## 2018-09-21 NOTE — PROGRESS NOTE ADULT - PROBLEM SELECTOR PLAN 5
-Last ativan requirement for withdrawal symptoms was approximately 36 hrs ago  -c/w ativan 1mg q2hrs for CIWA >8  - c/w thiamine 500mg q8hr until completion of 9 doses  - c/w multivitamin  -CIWA currently 0.  - C/w serial CIWA monitoring Patient clinically mildly hypovolemic to euvolemic. Unclear etiology possibly related to hypovolemia (improved Na+ after 1L NS) vs. strongly considering beer potomania vs. dysregulation of ADH given patient's urine Osm low with low specific gravity and having very high free water output suggesting auto-diuresis. Na+ 113 on presentation and uptrended to Na+ 126 after collins placement and 1L NS   - s/p 1L D5Q,1mcg of desmopressin IV, and standing D5W @ 200cc/hr then down to 100cc/hr.   -goal Na+ 131-133 today  -AM Na+ 125 on fluids and have since been stopped. Repeat Na+ trended up to 127 - continue to hold fluids as patient with ADH dysregulation.  - 1L fluid restriction   - BMP tonight 8 PM and for regular AM lab    #FEN  - F: No IVF currently, if using IVF, use NS  - E: Replete for K<4 and Mg <2  N: DASH    #Prophylactic Measure  HSQ for DVT PPx

## 2018-09-21 NOTE — PROGRESS NOTE ADULT - PROBLEM SELECTOR PLAN 3
CT A/P Also demonstrate distention of the urinary bladder with a suggestion of very mild bladder wall thickening with TURP defect that may represent the sequela of bladder outlet obstruction vs. cystitis.   Unlikely cystitis given ucx and UA both negative Patient acutely altered possibly due to alcohol withdrawal vs. infection vs. hyponatremia. Unlikely structural CT head negative for acute hemorrhage or infarct  -mental status improving, AAOX2/3. continue to monitor  -CIWAs have been 0  -c/w infection tx as above  -utox negative  -hyponatremia plan as below

## 2018-09-21 NOTE — PROGRESS NOTE ADULT - PROBLEM SELECTOR PLAN 1
Patient presented with  b/l erythema on proximal inner thighs with associated clear discharge, warmth, tenderness to palpation, and erythema   -scrotal skin cxs grew moderate gram positive cocci in pairs and chains, numerous mixed GNR, moderate alpha hemolytic strep, moderate Corynebacterium.  -blood cxs grew GN coccobacilli. Surveillance cultures pending. f/u BCxs  -UCxs negative  -no longer on clinda given low suspicion for nec fasciitis as CT A/P with no signs of gas or abscess.  - F/U Testicular U/S  - urology with low concern for Soni's gangrene

## 2018-09-21 NOTE — PROGRESS NOTE ADULT - PROBLEM SELECTOR PLAN 9
Originally, petechial rash appreciated with Plts 79 most likely due to alcoholism  -plts 83 this AM  -continue to monitor CT A/P Also demonstrate distention of the urinary bladder with a suggestion of very mild bladder wall thickening with TURP defect that may represent the sequela of bladder outlet obstruction vs. cystitis.   Unlikely cystitis given ucx and UA both negative

## 2018-09-21 NOTE — PROGRESS NOTE ADULT - SUBJECTIVE AND OBJECTIVE BOX
SUBJECTIVE: Patient examined bedside. No acute events overnight. Denies fevers/chills/nausea/emesis/dysuria.     fluconAZOLE   Tablet 200 milliGRAM(s) Oral every 24 hours  heparin  Injectable 5000 Unit(s) SubCutaneous every 8 hours  piperacillin/tazobactam IVPB. 3.375 Gram(s) IV Intermittent every 6 hours  vancomycin  IVPB 1250 milliGRAM(s) IV Intermittent every 12 hours      Vital Signs Last 24 Hrs  T(C): 37.2 (21 Sep 2018 06:45), Max: 37.9 (20 Sep 2018 16:46)  T(F): 98.9 (21 Sep 2018 06:45), Max: 100.3 (20 Sep 2018 16:46)  HR: 74 (21 Sep 2018 09:00) (62 - 96)  BP: 114/65 (21 Sep 2018 09:00) (105/71 - 146/82)  BP(mean): 87 (21 Sep 2018 09:00) (79 - 107)  RR: 37 (21 Sep 2018 09:00) (28 - 61)  SpO2: 96% (21 Sep 2018 09:00) (90% - 98%)    General: NAD, resting comfortably in bed  Pulm: Nonlabored breathing, no respiratory distress  Abd: soft, NT/ND.  : Mildly improved erythematous scrotum without fluctuant, edema, tenderness, or crepitus. Perineum without fluctuance, tenderness, edema, or crepitus.     LABS:                        12.2   4.2   )-----------( 83       ( 21 Sep 2018 05:07 )             34.1     09-21    127<L>  |  89<L>  |  3<L>  ----------------------------<  104<H>  4.0   |  26  |  0.53    Ca    8.4      21 Sep 2018 09:23  Phos  2.3     09-21  Mg     2.1     09-21    TPro  5.3<L>  /  Alb  2.6<L>  /  TBili  1.2  /  DBili  x   /  AST  91<H>  /  ALT  41  /  AlkPhos  54  09-20    PT/INR - ( 19 Sep 2018 14:53 )   PT: 10.0 sec;   INR: 0.90          PTT - ( 19 Sep 2018 14:53 )  PTT:30.3 sec

## 2018-09-21 NOTE — PROGRESS NOTE ADULT - ASSESSMENT
Patient is a 63M with PMHx of alcohol abuse x30 years presented to [facility] for alcohol withdrawal was found to have b/l erythema on proximal inner thighs with associated clear discharge, warmth, tenderness to palpation, and erythema 2/2 cellulitis. Also with hyponatremia and seizure likely due to alcohol withdrawal    ID  #Scrotal Cellulitis, b/l medial thigh cellulitis: Patient presented with  b/l erythema on proximal inner thighs with associated clear discharge, warmth, tenderness to palpation, and erythema   -scrotal skin cxs grew moderate gram positive cocci in pairs and chains, numerous mixed GNR, moderate alpha hemolytic strep, moderate Corynebacterium.  -blood cxs grew GN coccobacilli. Will repeat blood cxs in AM  -UCxs negative  -d/dequan clinda given low suspicion for nec fasciitis as CT A/P with no signs of gas or abscess.  -c/w fluconazole 200mg daily for empiric fungal coverage   -c/w zosyn 3.375g q6hr for GN coccobacilli   -c/w vanc 1g q12hr. Vanc trough @ 11AM tomorrow  -f/u Testical US   -CT A/P Also demonstrate distention of the urinary bladder with a suggestion of very mild bladder wall thickening with TURP defect that may represent the sequela of bladder outlet obstruction vs. cystitis. Unlikely cystitis given ucx and UA both negative    Neuro  #Toxic Metabolic Encephalopathy: Patient acutely altered possibly due to alcohol withdrawal vs. infection vs. hyponatremia. Unlikely structural CT head negative for acute hemorrhage or infarct  -mental status improving and now AAOX2. continue to monitor  -c/w alcohol withdrawal tx as below  -c/w infection tx as above  -utox negative  -hyponatremia plan as below    #Alcohol withdrawal  -required ativan 1mg x2 o/n for agitation. Has not required any throughout the day today.  -c/w ativan 1mg q2hrs for CIWA >8  -c/w thiamine 500mg q8hr  -c/w multivitamin  -CIWA 4 (positive for tremulousness, confusion, agitation). C/w serial CIWA monitoring    Renal  #Hyponatremia: Patient clinically mildly hypovolemic to euvolemic. Unclear etiology possibly related to hypovolemia (improved Na+ after 1L NS) vs. strongly considering beer potomania vs. dysregulation of ADH given patient's urine Osm low with low specific gravity and having very high free water output suggesting auto-diuresis.  -Na+ 113 on presentation. Uptrending to Na+ 126 after collins placement and 1L NS  -in attempts to reverse overcorrection gave 1L D5W and started D5W @200cc/hr and gave 1 mcg of desmopresin IV  -goal Na+ 128-130. Na+ at goal today  -c/w IV D5W @100cc/hr  -BMP q4hr    #Hypomagnesemia: Resolved  -Mg 1.5 on admission. Now 2.3  -continue to monitor    #Hypokalemia: resolved   -K+ 4.1 s/p repletion    Pulm  #Respiratory Alkalosis:   -VBG demonstrating pH7.47 with pCO2 34. Likely due to initial respiratory distress from alcohol withdrawal vs. infection. Continue to monitor   -Saturating 90-96% on NC O2 4L. No respiratory distress, no use of accessory muscles for inspiration. Continue to titrate O2 as needed  -CT chest demonstrated b/l groundglass opacities that have a peripheral bronchovascular distribution possibly due to pulmonary hemorrhage and/or contusion as pattern is less typical for a multifocal pneumonia however PNA cannot be excluded. Pattern less typical for aspiration. Further, focal atelectatic change involving the posterior basal segment of the left lower lobe versus parenchymal contusion. Continue to monitor on antibx.     GI  #Transaminitis: AST//54 on admission likely due to alcohol abuse given ratio >2:1  -AST/ALT downtrending to 91/41. Continue to monitor  -CT A/P demonstrated hepatic steatosis  -hepatitis B/C negative    Endo  #Pressure Ulcers: Patient with b/l erythema overlying the DIP joints of toes on physical exam. Possibly 2/2 pressure ulcers   -HbA1C 5.1.    Heme  #Thrombocytopenia: Petechial rash appreciated with Plts 79 most likely due to alcoholism  -plts downtrended to 69 this AM possibly dilutional as other cell lines also down trended s/p fluids    CV: No acute hemodynamic instability. Continue to monitor    F: D5 @100cc/hr  E: Replete for K<4 and Mg <2  N: DASH    DVT ppx: Heparin SubQ   Dispo: MICU Patient is a 63M with PMHx of alcohol abuse x30 years presented to [facility] for alcohol withdrawal was found to have b/l erythema on proximal inner thighs with associated clear discharge, warmth, tenderness to palpation, and erythema 2/2 cellulitis. Also with hyponatremia and seizure likely due to alcohol withdrawal    ID  #Scrotal Cellulitis, b/l medial thigh cellulitis: Patient presented with  b/l erythema on proximal inner thighs with associated clear discharge, warmth, tenderness to palpation, and erythema   -scrotal skin cxs grew moderate gram positive cocci in pairs and chains, numerous mixed GNR, moderate alpha hemolytic strep, moderate Corynebacterium.  -blood cxs grew GN coccobacilli. Will repeat blood cxs in AM  -UCxs negative  -d/dequan clinda given low suspicion for nec fasciitis as CT A/P with no signs of gas or abscess.  -c/w fluconazole 200mg daily for empiric fungal coverage   -c/w zosyn 3.375g q6hr for GN coccobacilli for which await identification  -DC vanc 1g q12hr.   -f/u Testicle US   -CT A/P Also demonstrate distention of the urinary bladder with a suggestion of very mild bladder wall thickening with TURP defect that may represent the sequela of bladder outlet obstruction vs. cystitis. Unlikely cystitis given ucx and UA both negative    Neuro  #Toxic Metabolic Encephalopathy: Patient acutely altered possibly due to alcohol withdrawal vs. infection vs. hyponatremia. Unlikely structural CT head negative for acute hemorrhage or infarct  -mental status improving and now AAOX2. continue to monitor  -c/w alcohol withdrawal tx as below  -c/w infection tx as above  -utox negative  -hyponatremia plan as below    #Alcohol withdrawal  -required ativan 1mg x2 o/n for agitation. Has not required any throughout the day today.  -c/w ativan 1mg q2hrs for CIWA >8  -c/w thiamine 500mg q8hr  -c/w multivitamin  -CIWA 4 (positive for tremulousness, confusion, agitation). C/w serial CIWA monitoring    Renal  #Hyponatremia: Patient clinically mildly hypovolemic to euvolemic. Unclear etiology possibly related to hypovolemia (improved Na+ after 1L NS) vs. strongly considering beer potomania vs. dysregulation of ADH given patient's urine Osm low with low specific gravity and having very high free water output suggesting auto-diuresis.  -Na+ 113 on presentation. Uptrending to Na+ 126 after collins placement and 1L NS  -in attempts to reverse overcorrection gave 1L D5W and started D5W @200cc/hr and gave 1 mcg of desmopresin IV  -goal Na+ 128-130. Na+ at goal today  -c/w IV D5W @100cc/hr  -BMP q4hr    #Hypomagnesemia: Resolved  -Mg 1.5 on admission. Now 2.3  -continue to monitor    #Hypokalemia: resolved   -K+ 4.1 s/p repletion    Pulm  #Respiratory Alkalosis:   -VBG demonstrating pH7.47 with pCO2 34. Likely due to initial respiratory distress from alcohol withdrawal vs. infection. Continue to monitor   -Saturating 90-96% on NC O2 4L. No respiratory distress, no use of accessory muscles for inspiration. Continue to titrate O2 as needed  -CT chest demonstrated b/l groundglass opacities that have a peripheral bronchovascular distribution possibly due to pulmonary hemorrhage and/or contusion as pattern is less typical for a multifocal pneumonia however PNA cannot be excluded. Pattern less typical for aspiration. Further, focal atelectatic change involving the posterior basal segment of the left lower lobe versus parenchymal contusion. Continue to monitor on antibx.     GI  #Transaminitis: AST//54 on admission likely due to alcohol abuse given ratio >2:1  -AST/ALT downtrending to 91/41. Continue to monitor  -CT A/P demonstrated hepatic steatosis  -hepatitis B/C negative    Endo  #Pressure Ulcers: Patient with b/l erythema overlying the DIP joints of toes on physical exam. Possibly 2/2 pressure ulcers   -HbA1C 5.1.    Heme  #Thrombocytopenia: Petechial rash appreciated with Plts 79 most likely due to alcoholism  -plts downtrended to 69 this AM possibly dilutional as other cell lines also down trended s/p fluids    CV: No acute hemodynamic instability. Continue to monitor    F: D5 @100cc/hr  E: Replete for K<4 and Mg <2  N: DASH    DVT ppx: Heparin SubQ   Dispo: MICU ID  #Scrotal Cellulitis, b/l medial thigh cellulitis: Patient presented with  b/l erythema on proximal inner thighs with associated clear discharge, warmth, tenderness to palpation, and erythema   -scrotal skin cxs grew moderate gram positive cocci in pairs and chains, numerous mixed GNR, moderate alpha hemolytic strep, moderate Corynebacterium.  -blood cxs grew GN coccobacilli. Surveillance cultures pending. f/u BCxs  -UCxs negative  -no longer on clinda given low suspicion for nec fasciitis as CT A/P with no signs of gas or abscess.  -c/w fluconazole 200mg daily for empiric fungal coverage. treatment course 5 days.   -c/w zosyn 3.375g q6hr for GN coccobacilli   -d/dequan Vanc as unlikely MRSA the cause of infection  -f/u Testical US   -CT A/P Also demonstrate distention of the urinary bladder with a suggestion of very mild bladder wall thickening with TURP defect that may represent the sequela of bladder outlet obstruction vs. cystitis. Unlikely cystitis given ucx and UA both negative    Neuro  #Toxic Metabolic Encephalopathy: Patient acutely altered possibly due to alcohol withdrawal vs. infection vs. hyponatremia. Unlikely structural CT head negative for acute hemorrhage or infarct  -mental status improving and now AAOX2. continue to monitor  -c/w alcohol withdrawal tx as below  -c/w infection tx as above  -utox negative  -hyponatremia plan as below    #Alcohol withdrawal  -required ativan 1mg x2 o/n for agitation. Has not required any throughout the day today.  -c/w ativan 1mg q2hrs for CIWA >8  -c/w thiamine 500mg q8hr  -c/w multivitamin  -CIWA 4 (positive for tremulousness, confusion, agitation). C/w serial CIWA monitoring    Renal  #Hyponatremia: Patient clinically mildly hypovolemic to euvolemic. Unclear etiology possibly related to hypovolemia (improved Na+ after 1L NS) vs. strongly considering beer potomania vs. dysregulation of ADH given patient's urine Osm low with low specific gravity and having very high free water output suggesting auto-diuresis.  -Na+ 113 on presentation. Uptrending to Na+ 126 after collins placement and 1L NS  -in attempts to reverse overcorrection gave 1L D5W and started D5W @200cc/hr and gave 1 mcg of desmopresin IV  -goal Na+ 128-130. Na+ at goal today  -c/w IV D5W @100cc/hr  -BMP q4hr    #Hypomagnesemia: Resolved  -Mg 1.5 on admission. Now 2.3  -continue to monitor    #Hypokalemia: resolved   -K+ 4.1 s/p repletion    Pulm  #Respiratory Alkalosis:   -VBG demonstrating pH7.47 with pCO2 34. Likely due to initial respiratory distress from alcohol withdrawal vs. infection. Continue to monitor   -Saturating 90-96% on NC O2 4L. No respiratory distress, no use of accessory muscles for inspiration. Continue to titrate O2 as needed  -CT chest demonstrated b/l groundglass opacities that have a peripheral bronchovascular distribution possibly due to pulmonary hemorrhage and/or contusion as pattern is less typical for a multifocal pneumonia however PNA cannot be excluded. Pattern less typical for aspiration. Further, focal atelectatic change involving the posterior basal segment of the left lower lobe versus parenchymal contusion. Continue to monitor on antibx.     GI  #Transaminitis: AST//54 on admission likely due to alcohol abuse given ratio >2:1  -AST/ALT downtrending to 91/41. Continue to monitor  -CT A/P demonstrated hepatic steatosis  -hepatitis B/C negative    Endo  #Pressure Ulcers: Patient with b/l erythema overlying the DIP joints of toes on physical exam. Possibly 2/2 pressure ulcers   -HbA1C 5.1.    Heme  #Thrombocytopenia: Petechial rash appreciated with Plts 79 most likely due to alcoholism  -plts downtrended to 69 this AM possibly dilutional as other cell lines also down trended s/p fluids    CV: No acute hemodynamic instability. Continue to monitor    F: D5 @100cc/hr  E: Replete for K<4 and Mg <2  N: DASH    DVT ppx: Heparin SubQ   Dispo: MICU ID  #Scrotal Cellulitis, b/l medial thigh cellulitis: Patient presented with  b/l erythema on proximal inner thighs with associated clear discharge, warmth, tenderness to palpation, and erythema   -scrotal skin cxs grew moderate gram positive cocci in pairs and chains, numerous mixed GNR, moderate alpha hemolytic strep, moderate Corynebacterium.  -blood cxs grew GN coccobacilli. Surveillance cultures pending. f/u BCxs  -UCxs negative  -no longer on clinda given low suspicion for nec fasciitis as CT A/P with no signs of gas or abscess.  -c/w fluconazole 200mg daily for empiric fungal coverage. treatment course 5 days.   -c/w zosyn 3.375g q6hr for GN coccobacilli   -d/dequan Vanc as unlikely MRSA the cause of infection  -f/u Testical US   -CT A/P Also demonstrate distention of the urinary bladder with a suggestion of very mild bladder wall thickening with TURP defect that may represent the sequela of bladder outlet obstruction vs. cystitis. Unlikely cystitis given ucx and UA both negative    Neuro  #Toxic Metabolic Encephalopathy: Patient acutely altered possibly due to alcohol withdrawal vs. infection vs. hyponatremia. Unlikely structural CT head negative for acute hemorrhage or infarct  -mental status improving, AAOX2. continue to monitor  -c/w alcohol withdrawal tx as below  -c/w infection tx as above  -utox negative  -hyponatremia plan as below    #Alcohol withdrawal  -Last ativan requirement for withdrawal symptoms was 9/20 overnight.  -c/w ativan 1mg q2hrs for CIWA >8  -c/w thiamine 500mg q8hr  -c/w multivitamin  -CIWA 4 (positive for tremulousness, confusion, agitation). C/w serial CIWA monitoring    Renal  #Hyponatremia: Patient clinically mildly hypovolemic to euvolemic. Unclear etiology possibly related to hypovolemia (improved Na+ after 1L NS) vs. strongly considering beer potomania vs. dysregulation of ADH given patient's urine Osm low with low specific gravity and having very high free water output suggesting auto-diuresis. Na+ 113 on presentation and uptrended to Na+ 126 after collins placement and 1L NS - s/p 1L D5Q,1mcg of desmopressin IV, and standing D5W @ 200cc/hr then down to 100cc/hr.   -goal Na+ 131-133 today  -AM Na+ 125 on fluids and have since been stopped. Repeat Na+ trended up to 127 - continue to hold fluids as patient with ADH dysregulation.   -BMP q4hr    #Hypomagnesemia: Resolved. Mg 1.5 on admission  -continue to monitor    #Hypokalemia: resolved  -K+ 4.1 s/p repletion    Pulm  #Respiratory Alkalosis:   -VBG demonstrating pH7.47 with pCO2 34. Likely due to initial respiratory distress from alcohol withdrawal vs. infection. Continue to monitor   -Initially required 4L O2 NC but now on RA in no respiratory distress, no use of accessory muscles for inspiration.   -CT chest demonstrated b/l groundglass opacities that have a peripheral bronchovascular distribution possibly due to pulmonary hemorrhage and/or contusion as pattern is less typical for a multifocal pneumonia however PNA cannot be excluded. Pattern less typical for aspiration. Further, focal atelectatic change involving the posterior basal segment of the left lower lobe versus parenchymal contusion. Continue to monitor on antibx.     GI  #Transaminitis: AST//54 on admission likely due to alcohol abuse given ratio >2:1  -AST/ALT downtrending to 91/41  -CT A/P demonstrated hepatic steatosis  -hepatitis B/C negative    Endo  #Pressure Ulcers: Patient with b/l erythema overlying the DIP joints of toes on physical exam. Possibly 2/2 pressure ulcers   -HbA1C 5.1.    Heme  #Thrombocytopenia: Petechial rash appreciated with Plts 79 most likely due to alcoholism  -plts 83 this AM  -continue to monitor    CV: No acute hemodynamic instability. Continue to monitor    F: no IVF  E: Replete for K<4 and Mg <2  N: DASH    DVT ppx: Heparin SubQ   Dispo: MICU

## 2018-09-21 NOTE — PROGRESS NOTE ADULT - ASSESSMENT
Patient a 63year old male with PMHx of alcohol abuse originally admitted for altered mental status and alcohol withdrawal, currrently undergoing management for bacteremia and hyponatremia, being transferred to [facility].    ID  #Scrotal Cellulitis, b/l medial thigh cellulitis: Patient presented with  b/l erythema on proximal inner thighs with associated clear discharge, warmth, tenderness to palpation, and erythema   -scrotal skin cxs grew moderate gram positive cocci in pairs and chains, numerous mixed GNR, moderate alpha hemolytic strep, moderate Corynebacterium.  -blood cxs grew GN coccobacilli. Surveillance cultures pending. f/u BCxs  -UCxs negative  -no longer on clinda given low suspicion for nec fasciitis as CT A/P with no signs of gas or abscess.  -c/w fluconazole 200mg daily for empiric fungal coverage. treatment course 5 days.   -c/w zosyn 3.375g q6hr for GN coccobacilli   -d/dequan Vanc as unlikely MRSA the cause of infection  -f/u Testical US   -CT A/P Also demonstrate distention of the urinary bladder with a suggestion of very mild bladder wall thickening with TURP defect that may represent the sequela of bladder outlet obstruction vs. cystitis. Unlikely cystitis given ucx and UA both negative    Neuro  #Toxic Metabolic Encephalopathy: Patient acutely altered possibly due to alcohol withdrawal vs. infection vs. hyponatremia. Unlikely structural CT head negative for acute hemorrhage or infarct  -mental status improving, AAOX2. continue to monitor  -c/w alcohol withdrawal tx as below  -c/w infection tx as above  -utox negative  -hyponatremia plan as below    #Alcohol withdrawal  -Last ativan requirement for withdrawal symptoms was 9/20 overnight.  -c/w ativan 1mg q2hrs for CIWA >8  -c/w thiamine 500mg q8hr  -c/w multivitamin  -CIWA 4 (positive for tremulousness, confusion, agitation). C/w serial CIWA monitoring    Renal  #Hyponatremia: Patient clinically mildly hypovolemic to euvolemic. Unclear etiology possibly related to hypovolemia (improved Na+ after 1L NS) vs. strongly considering beer potomania vs. dysregulation of ADH given patient's urine Osm low with low specific gravity and having very high free water output suggesting auto-diuresis. Na+ 113 on presentation and uptrended to Na+ 126 after collins placement and 1L NS - s/p 1L D5Q,1mcg of desmopressin IV, and standing D5W @ 200cc/hr then down to 100cc/hr.   -goal Na+ 131-133 today  -AM Na+ 125 on fluids and have since been stopped. Repeat Na+ trended up to 127 - continue to hold fluids as patient with ADH dysregulation.   -BMP q4hr    #Hypomagnesemia: Resolved. Mg 1.5 on admission  -continue to monitor    #Hypokalemia: resolved  -K+ 4.1 s/p repletion    Pulm  #Respiratory Alkalosis:   -VBG demonstrating pH7.47 with pCO2 34. Likely due to initial respiratory distress from alcohol withdrawal vs. infection. Continue to monitor   -Initially required 4L O2 NC but now on RA in no respiratory distress, no use of accessory muscles for inspiration.   -CT chest demonstrated b/l groundglass opacities that have a peripheral bronchovascular distribution possibly due to pulmonary hemorrhage and/or contusion as pattern is less typical for a multifocal pneumonia however PNA cannot be excluded. Pattern less typical for aspiration. Further, focal atelectatic change involving the posterior basal segment of the left lower lobe versus parenchymal contusion. Continue to monitor on antibx.     GI  #Transaminitis: AST//54 on admission likely due to alcohol abuse given ratio >2:1  -AST/ALT downtrending to 91/41  -CT A/P demonstrated hepatic steatosis  -hepatitis B/C negative    Endo  #Pressure Ulcers: Patient with b/l erythema overlying the DIP joints of toes on physical exam. Possibly 2/2 pressure ulcers   -HbA1C 5.1.    Heme  #Thrombocytopenia: Petechial rash appreciated with Plts 79 most likely due to alcoholism  -plts 83 this AM  -continue to monitor    CV: No acute hemodynamic instability. Continue to monitor    F: no IVF  E: Replete for K<4 and Mg <2  N: DASH    DVT ppx: Heparin SubQ   Dispo: MICU Patient a 63year old male with PMHx of alcohol abuse originally admitted for altered mental status and alcohol withdrawal, currrently undergoing management for bacteremia and hyponatremia, being transferred to [facility].

## 2018-09-21 NOTE — PROGRESS NOTE ADULT - ASSESSMENT
63M p/w alcohol withdrawal p/w scrotal erythema    -Low suspicion for Soni's gangrene at this time  -c/w abx  -testicular u/s  -Care per primary team  -Urology to follow

## 2018-09-21 NOTE — PROGRESS NOTE ADULT - PROBLEM SELECTOR PLAN 7
-VBG originally demonstrating pH7.47 with pCO2 34. Likely due to initial respiratory distress from alcohol withdrawal vs. infection. Continue to monitor   -Initially required 4L O2 NC but now on RA in no respiratory distress, no use of accessory muscles for inspiration.   -CT chest demonstrated b/l groundglass opacities that have a peripheral bronchovascular distribution possibly due to pulmonary hemorrhage and/or contusion as pattern is less typical for a multifocal pneumonia however PNA cannot be excluded. Pattern less typical for aspiration. Further, focal atelectatic change involving the posterior basal segment of the left lower lobe versus parenchymal contusion. Continue to monitor on antibx. #Transaminitis: AST//54 on admission likely due to alcohol abuse given ratio >2:1  -AST/ALT downtrending to 91/41  -CT A/P demonstrated hepatic steatosis  -hepatitis B/C negative

## 2018-09-21 NOTE — PROGRESS NOTE ADULT - PROBLEM SELECTOR PLAN 6
Patient clinically mildly hypovolemic to euvolemic. Unclear etiology possibly related to hypovolemia (improved Na+ after 1L NS) vs. strongly considering beer potomania vs. dysregulation of ADH given patient's urine Osm low with low specific gravity and having very high free water output suggesting auto-diuresis. Na+ 113 on presentation and uptrended to Na+ 126 after collins placement and 1L NS   - s/p 1L D5Q,1mcg of desmopressin IV, and standing D5W @ 200cc/hr then down to 100cc/hr.   -goal Na+ 131-133 today  -AM Na+ 125 on fluids and have since been stopped. Repeat Na+ trended up to 127 - continue to hold fluids as patient with ADH dysregulation.  - 1L fluid restriction   - BMP tonight 8 PM and for regular AM lab    #FEN  - F: No IVF currently, if using IVF, use NS  - E: Replete for K<4 and Mg <2  N: DASH    #Prophylactic Measure  HSQ for DVT PPx -VBG originally demonstrating pH7.47 with pCO2 34. Likely due to initial respiratory distress from alcohol withdrawal vs. infection. Continue to monitor   -Initially required 4L O2 NC but now on RA in no respiratory distress, no use of accessory muscles for inspiration.   -CT chest demonstrated b/l groundglass opacities that have a peripheral bronchovascular distribution possibly due to pulmonary hemorrhage and/or contusion as pattern is less typical for a multifocal pneumonia however PNA cannot be excluded. Pattern less typical for aspiration. Further, focal atelectatic change involving the posterior basal segment of the left lower lobe versus parenchymal contusion. Continue to monitor on antibx.

## 2018-09-22 LAB
ANION GAP SERPL CALC-SCNC: 13 MMOL/L — SIGNIFICANT CHANGE UP (ref 5–17)
ANION GAP SERPL CALC-SCNC: 14 MMOL/L — SIGNIFICANT CHANGE UP (ref 5–17)
ANION GAP SERPL CALC-SCNC: 14 MMOL/L — SIGNIFICANT CHANGE UP (ref 5–17)
BUN SERPL-MCNC: 4 MG/DL — LOW (ref 7–23)
BUN SERPL-MCNC: 4 MG/DL — LOW (ref 7–23)
BUN SERPL-MCNC: 5 MG/DL — LOW (ref 7–23)
CALCIUM SERPL-MCNC: 8.5 MG/DL — SIGNIFICANT CHANGE UP (ref 8.4–10.5)
CALCIUM SERPL-MCNC: 8.6 MG/DL — SIGNIFICANT CHANGE UP (ref 8.4–10.5)
CALCIUM SERPL-MCNC: 9 MG/DL — SIGNIFICANT CHANGE UP (ref 8.4–10.5)
CHLORIDE SERPL-SCNC: 90 MMOL/L — LOW (ref 96–108)
CHLORIDE SERPL-SCNC: 91 MMOL/L — LOW (ref 96–108)
CHLORIDE SERPL-SCNC: 93 MMOL/L — LOW (ref 96–108)
CO2 SERPL-SCNC: 24 MMOL/L — SIGNIFICANT CHANGE UP (ref 22–31)
CO2 SERPL-SCNC: 24 MMOL/L — SIGNIFICANT CHANGE UP (ref 22–31)
CO2 SERPL-SCNC: 25 MMOL/L — SIGNIFICANT CHANGE UP (ref 22–31)
CREAT SERPL-MCNC: 0.48 MG/DL — LOW (ref 0.5–1.3)
CREAT SERPL-MCNC: 0.48 MG/DL — LOW (ref 0.5–1.3)
CREAT SERPL-MCNC: 0.49 MG/DL — LOW (ref 0.5–1.3)
GLUCOSE SERPL-MCNC: 82 MG/DL — SIGNIFICANT CHANGE UP (ref 70–99)
GLUCOSE SERPL-MCNC: 84 MG/DL — SIGNIFICANT CHANGE UP (ref 70–99)
GLUCOSE SERPL-MCNC: 86 MG/DL — SIGNIFICANT CHANGE UP (ref 70–99)
HCT VFR BLD CALC: 35.3 % — LOW (ref 39–50)
HGB BLD-MCNC: 12.3 G/DL — LOW (ref 13–17)
LG PLATELETS BLD QL AUTO: PRESENT — SIGNIFICANT CHANGE UP
MAGNESIUM SERPL-MCNC: 1.9 MG/DL — SIGNIFICANT CHANGE UP (ref 1.6–2.6)
MCHC RBC-ENTMCNC: 32.6 PG — SIGNIFICANT CHANGE UP (ref 27–34)
MCHC RBC-ENTMCNC: 34.8 G/DL — SIGNIFICANT CHANGE UP (ref 32–36)
MCV RBC AUTO: 93.6 FL — SIGNIFICANT CHANGE UP (ref 80–100)
PHOSPHATE SERPL-MCNC: 4.4 MG/DL — SIGNIFICANT CHANGE UP (ref 2.5–4.5)
PLAT MORPH BLD: ABNORMAL
PLATELET # BLD AUTO: 96 K/UL — LOW (ref 150–400)
POTASSIUM SERPL-MCNC: 3.4 MMOL/L — LOW (ref 3.5–5.3)
POTASSIUM SERPL-MCNC: 3.7 MMOL/L — SIGNIFICANT CHANGE UP (ref 3.5–5.3)
POTASSIUM SERPL-MCNC: 4 MMOL/L — SIGNIFICANT CHANGE UP (ref 3.5–5.3)
POTASSIUM SERPL-SCNC: 3.4 MMOL/L — LOW (ref 3.5–5.3)
POTASSIUM SERPL-SCNC: 3.7 MMOL/L — SIGNIFICANT CHANGE UP (ref 3.5–5.3)
POTASSIUM SERPL-SCNC: 4 MMOL/L — SIGNIFICANT CHANGE UP (ref 3.5–5.3)
RBC # BLD: 3.77 M/UL — LOW (ref 4.2–5.8)
RBC # FLD: 12.5 % — SIGNIFICANT CHANGE UP (ref 10.3–16.9)
RBC BLD AUTO: NORMAL — SIGNIFICANT CHANGE UP
SODIUM SERPL-SCNC: 128 MMOL/L — LOW (ref 135–145)
SODIUM SERPL-SCNC: 129 MMOL/L — LOW (ref 135–145)
SODIUM SERPL-SCNC: 131 MMOL/L — LOW (ref 135–145)
WBC # BLD: 3 K/UL — LOW (ref 3.8–10.5)
WBC # FLD AUTO: 3 K/UL — LOW (ref 3.8–10.5)

## 2018-09-22 PROCEDURE — 99233 SBSQ HOSP IP/OBS HIGH 50: CPT | Mod: GC

## 2018-09-22 RX ORDER — NYSTATIN CREAM 100000 [USP'U]/G
1 CREAM TOPICAL
Qty: 0 | Refills: 0 | Status: DISCONTINUED | OUTPATIENT
Start: 2018-09-22 | End: 2018-09-25

## 2018-09-22 RX ORDER — POTASSIUM CHLORIDE 20 MEQ
40 PACKET (EA) ORAL EVERY 4 HOURS
Qty: 0 | Refills: 0 | Status: COMPLETED | OUTPATIENT
Start: 2018-09-22 | End: 2018-09-22

## 2018-09-22 RX ADMIN — NYSTATIN CREAM 1 APPLICATION(S): 100000 CREAM TOPICAL at 18:22

## 2018-09-22 RX ADMIN — HEPARIN SODIUM 5000 UNIT(S): 5000 INJECTION INTRAVENOUS; SUBCUTANEOUS at 22:18

## 2018-09-22 RX ADMIN — HEPARIN SODIUM 5000 UNIT(S): 5000 INJECTION INTRAVENOUS; SUBCUTANEOUS at 06:31

## 2018-09-22 RX ADMIN — FLUCONAZOLE 200 MILLIGRAM(S): 150 TABLET ORAL at 06:58

## 2018-09-22 RX ADMIN — Medication 40 MILLIEQUIVALENT(S): at 14:17

## 2018-09-22 RX ADMIN — PIPERACILLIN AND TAZOBACTAM 200 GRAM(S): 4; .5 INJECTION, POWDER, LYOPHILIZED, FOR SOLUTION INTRAVENOUS at 12:05

## 2018-09-22 RX ADMIN — PIPERACILLIN AND TAZOBACTAM 200 GRAM(S): 4; .5 INJECTION, POWDER, LYOPHILIZED, FOR SOLUTION INTRAVENOUS at 18:21

## 2018-09-22 RX ADMIN — NYSTATIN CREAM 1 APPLICATION(S): 100000 CREAM TOPICAL at 06:30

## 2018-09-22 RX ADMIN — Medication 1 MILLIGRAM(S): at 12:05

## 2018-09-22 RX ADMIN — Medication 62.5 MILLIMOLE(S): at 02:55

## 2018-09-22 RX ADMIN — Medication 1 TABLET(S): at 12:05

## 2018-09-22 RX ADMIN — PIPERACILLIN AND TAZOBACTAM 200 GRAM(S): 4; .5 INJECTION, POWDER, LYOPHILIZED, FOR SOLUTION INTRAVENOUS at 01:20

## 2018-09-22 RX ADMIN — Medication 40 MILLIEQUIVALENT(S): at 10:43

## 2018-09-22 RX ADMIN — HEPARIN SODIUM 5000 UNIT(S): 5000 INJECTION INTRAVENOUS; SUBCUTANEOUS at 14:16

## 2018-09-22 RX ADMIN — Medication 100 MILLIGRAM(S): at 12:05

## 2018-09-22 RX ADMIN — PIPERACILLIN AND TAZOBACTAM 200 GRAM(S): 4; .5 INJECTION, POWDER, LYOPHILIZED, FOR SOLUTION INTRAVENOUS at 06:30

## 2018-09-22 NOTE — PROGRESS NOTE ADULT - PROBLEM SELECTOR PLAN 6
No respiratory distress noted currently.   -VBG originally demonstrating pH7.47 with pCO2 34. Likely due to initial respiratory distress from alcohol withdrawal vs. infection. Continue to monitor   -Initially required 4L O2 NC but now on RA in no respiratory distress, no use of accessory muscles for inspiration.   -CT chest demonstrated b/l groundglass opacities that have a peripheral bronchovascular distribution possibly due to pulmonary hemorrhage and/or contusion as pattern is less typical for a multifocal pneumonia however PNA cannot be excluded. Pattern less typical for aspiration. Further, focal atelectatic change involving the posterior basal segment of the left lower lobe versus parenchymal contusion. Continue to monitor on antibx.

## 2018-09-22 NOTE — PROGRESS NOTE ADULT - ASSESSMENT
Patient a 63year old male with PMHx of alcohol abuse originally admitted for altered mental status and alcohol withdrawal, currrently undergoing management for bacteremia and hyponatremia, being transferred to [facility].

## 2018-09-22 NOTE — PROGRESS NOTE ADULT - PROBLEM SELECTOR PLAN 8
Originally, petechial rash appreciated with Plts 79 most likely due to alcoholism  -plts 83 this AM  -continue to monitor

## 2018-09-22 NOTE — PROGRESS NOTE ADULT - PROBLEM SELECTOR PLAN 9
CT A/P Also demonstrate distention of the urinary bladder with a suggestion of very mild bladder wall thickening with TURP defect that may represent the sequela of bladder outlet obstruction vs. cystitis.   Unlikely cystitis given ucx and UA both negative

## 2018-09-22 NOTE — PROGRESS NOTE ADULT - PROBLEM SELECTOR PLAN 3
Patient acutely altered possibly due to alcohol withdrawal vs. infection vs. hyponatremia. Structural CT head negative for acute hemorrhage or infarct  -mental status improved, continue to monitor  -CIWAs have been 0 - d/c'd PRN Ativan  -c/w infection tx as above  -utox negative  -hyponatremia plan as below

## 2018-09-22 NOTE — PROGRESS NOTE ADULT - SUBJECTIVE AND OBJECTIVE BOX
OVERNIGHT EVENTS:    SUBJECTIVE / INTERVAL HPI: Patient seen and examined at bedside.     VITAL SIGNS:  Vital Signs Last 24 Hrs  T(C): 37.4 (22 Sep 2018 08:48), Max: 37.6 (21 Sep 2018 19:52)  T(F): 99.4 (22 Sep 2018 08:48), Max: 99.6 (21 Sep 2018 19:52)  HR: 71 (22 Sep 2018 08:48) (62 - 90)  BP: 127/83 (22 Sep 2018 08:48) (110/74 - 156/96)  BP(mean): 98 (22 Sep 2018 08:48) (88 - 103)  RR: 18 (22 Sep 2018 08:48) (18 - 31)  SpO2: 96% (22 Sep 2018 08:48) (94% - 99%)    PHYSICAL EXAM:    General: WDWN  HEENT: NC/AT; PERRL, anicteric sclera; MMM  Neck: supple  Cardiovascular: +S1/S2, RRR  Respiratory: CTA B/L; no W/R/R  Gastrointestinal: soft, NT/ND; +BSx4  Extremities: WWP; no edema, clubbing or cyanosis  Vascular: 2+ radial, DP/PT pulses B/L  Neurological: AAOx3; no focal deficits    MEDICATIONS:  MEDICATIONS  (STANDING):  fluconAZOLE   Tablet 200 milliGRAM(s) Oral every 24 hours  folic acid 1 milliGRAM(s) Oral daily  heparin  Injectable 5000 Unit(s) SubCutaneous every 8 hours  multivitamin 1 Tablet(s) Oral daily  nystatin Powder 1 Application(s) Topical two times a day  piperacillin/tazobactam IVPB. 3.375 Gram(s) IV Intermittent every 6 hours  potassium chloride    Tablet ER 40 milliEquivalent(s) Oral every 4 hours  thiamine 100 milliGRAM(s) Oral daily    MEDICATIONS  (PRN):      ALLERGIES:  Allergies    No Known Allergies    Intolerances        LABS:                        12.3   3.0   )-----------( 96       ( 22 Sep 2018 06:43 )             35.3     09-22    129<L>  |  91<L>  |  4<L>  ----------------------------<  86  3.4<L>   |  24  |  0.49<L>    Ca    8.5      22 Sep 2018 06:43  Phos  2.4     09-21  Mg     1.9     09-22          CAPILLARY BLOOD GLUCOSE      POCT Blood Glucose.: 98 mg/dL (21 Sep 2018 11:27)      RADIOLOGY & ADDITIONAL TESTS: Reviewed.    ASSESSMENT:    PLAN: OVERNIGHT EVENTS: No acute events overnight.     SUBJECTIVE / INTERVAL HPI: Patient seen and examined at bedside. He denies any symptoms and says his scrotal/groin rash is improving. His CIWA score was 0 this morning and last night. He denies CP, SOB, n/v/d, f/c, weakness, dysuria.       VITAL SIGNS:  Vital Signs Last 24 Hrs  T(C): 37.4 (22 Sep 2018 08:48), Max: 37.6 (21 Sep 2018 19:52)  T(F): 99.4 (22 Sep 2018 08:48), Max: 99.6 (21 Sep 2018 19:52)  HR: 71 (22 Sep 2018 08:48) (62 - 90)  BP: 127/83 (22 Sep 2018 08:48) (110/74 - 156/96)  BP(mean): 98 (22 Sep 2018 08:48) (88 - 103)  RR: 18 (22 Sep 2018 08:48) (18 - 31)  SpO2: 96% (22 Sep 2018 08:48) (94% - 99%)    PHYSICAL EXAM:  General: WDWN. Non-tremulous.   HEENT: NC/AT; PERRL, anicteric sclera; MMM  Cardiovascular: +S1/S2, RRR, +soft systolic flow murmur  Respiratory: CTA B/L; no W/R/R  Gastrointestinal: soft, NT/ND  Extremities: WWP; no edema, clubbing or cyanosis  Genital: +erythema on scrotum and inner thighs, no fluctuance, pus, blood, ulceration.   Vascular: 2+ radial, DP/PT pulses B/L  Neurological: AAOx3; no focal deficits    MEDICATIONS:  MEDICATIONS  (STANDING):  fluconAZOLE   Tablet 200 milliGRAM(s) Oral every 24 hours  folic acid 1 milliGRAM(s) Oral daily  heparin  Injectable 5000 Unit(s) SubCutaneous every 8 hours  multivitamin 1 Tablet(s) Oral daily  nystatin Powder 1 Application(s) Topical two times a day  piperacillin/tazobactam IVPB. 3.375 Gram(s) IV Intermittent every 6 hours  potassium chloride    Tablet ER 40 milliEquivalent(s) Oral every 4 hours  thiamine 100 milliGRAM(s) Oral daily    ALLERGIES:  Allergies    No Known Allergies    Intolerances        LABS:                        12.3   3.0   )-----------( 96       ( 22 Sep 2018 06:43 )             35.3     09-22    129<L>  |  91<L>  |  4<L>  ----------------------------<  86  3.4<L>   |  24  |  0.49<L>    Ca    8.5      22 Sep 2018 06:43  Phos  2.4     09-21  Mg     1.9     09-22    RADIOLOGY & ADDITIONAL TESTS: Reviewed.

## 2018-09-22 NOTE — PROGRESS NOTE ADULT - PROBLEM SELECTOR PLAN 5
Patient clinically mildly hypovolemic to euvolemic. Unclear etiology possibly related to hypovolemia (improved Na+ after 1L NS) vs. strongly considering beer potomania vs. dysregulation of ADH given patient's urine Osm low with low specific gravity and having very high free water output suggesting auto-diuresis. Na+ 113 on presentation and uptrended to Na+ 126 after collins placement and 1L NS   - s/p 1L D5Q,1mcg of desmopressin IV, and standing D5W @ 200cc/hr then down to 100cc/hr.   - 1L fluid restriction; Na's have been uptrending slowly  - BMP tonight 6 PM and AM labs    #FEN  - F: No IVF currently, if using IVF, use NS  - E: Replete for K<4 and Mg <2  N: DASH    #Prophylactic Measure  HSQ for DVT PPx

## 2018-09-22 NOTE — PROGRESS NOTE ADULT - PROBLEM SELECTOR PLAN 4
-Last ativan requirement for withdrawal symptoms was approximately 36 hrs ago  -c/w ativan 1mg q2hrs for CIWA >8  - c/w thiamine 500mg q8hr until completion of 9 doses  - c/w multivitamin  -CIWA currently 0.  - C/w serial CIWA monitoring

## 2018-09-22 NOTE — PROGRESS NOTE ADULT - PROBLEM SELECTOR PLAN 7
#Transaminitis: AST//54 on admission likely due to alcohol abuse given ratio >2:1  -AST/ALT downtrending to 91/41  -CT A/P demonstrated hepatic steatosis  -hepatitis B/C negative

## 2018-09-22 NOTE — PROGRESS NOTE ADULT - PROBLEM SELECTOR PLAN 1
Patient presented with  b/l erythema on proximal inner thighs with associated clear discharge, warmth, tenderness to palpation, and erythema   -scrotal skin cxs grew moderate gram positive cocci in pairs and chains, numerous mixed GNR, moderate alpha hemolytic strep, moderate Corynebacterium.  -blood cxs grew GN coccobacilli. Surveillance cultures pending. new cultures NGTD  - f/u antibiotic resistance  -UCxs negative  -no longer on clinda given low suspicion for nec fasciitis as CT A/P with no signs of gas or abscess.  - F/U Testicular U/S  - urology with low concern for Soni's gangrene

## 2018-09-23 LAB
ALBUMIN SERPL ELPH-MCNC: 3.6 G/DL — SIGNIFICANT CHANGE UP (ref 3.3–5)
ALP SERPL-CCNC: 60 U/L — SIGNIFICANT CHANGE UP (ref 40–120)
ALT FLD-CCNC: 89 U/L — HIGH (ref 10–45)
AMMONIA BLD-MCNC: 26 UMOL/L — SIGNIFICANT CHANGE UP (ref 11–55)
ANION GAP SERPL CALC-SCNC: 14 MMOL/L — SIGNIFICANT CHANGE UP (ref 5–17)
AST SERPL-CCNC: 124 U/L — HIGH (ref 10–40)
BILIRUB SERPL-MCNC: 0.7 MG/DL — SIGNIFICANT CHANGE UP (ref 0.2–1.2)
BUN SERPL-MCNC: 4 MG/DL — LOW (ref 7–23)
CALCIUM SERPL-MCNC: 8.9 MG/DL — SIGNIFICANT CHANGE UP (ref 8.4–10.5)
CHLORIDE SERPL-SCNC: 94 MMOL/L — LOW (ref 96–108)
CO2 SERPL-SCNC: 26 MMOL/L — SIGNIFICANT CHANGE UP (ref 22–31)
CREAT SERPL-MCNC: 0.6 MG/DL — SIGNIFICANT CHANGE UP (ref 0.5–1.3)
GLUCOSE SERPL-MCNC: 87 MG/DL — SIGNIFICANT CHANGE UP (ref 70–99)
HCT VFR BLD CALC: 37.4 % — LOW (ref 39–50)
HGB BLD-MCNC: 12.8 G/DL — LOW (ref 13–17)
MAGNESIUM SERPL-MCNC: 2 MG/DL — SIGNIFICANT CHANGE UP (ref 1.6–2.6)
MCHC RBC-ENTMCNC: 32.3 PG — SIGNIFICANT CHANGE UP (ref 27–34)
MCHC RBC-ENTMCNC: 34.2 G/DL — SIGNIFICANT CHANGE UP (ref 32–36)
MCV RBC AUTO: 94.4 FL — SIGNIFICANT CHANGE UP (ref 80–100)
OSMOLALITY UR: 399 MOSMOL/KG — SIGNIFICANT CHANGE UP (ref 100–650)
PHOSPHATE SERPL-MCNC: 4.4 MG/DL — SIGNIFICANT CHANGE UP (ref 2.5–4.5)
PLATELET # BLD AUTO: 122 K/UL — LOW (ref 150–400)
POTASSIUM SERPL-MCNC: 4.1 MMOL/L — SIGNIFICANT CHANGE UP (ref 3.5–5.3)
POTASSIUM SERPL-SCNC: 4.1 MMOL/L — SIGNIFICANT CHANGE UP (ref 3.5–5.3)
POTASSIUM UR-SCNC: 26 MMOL/L — SIGNIFICANT CHANGE UP
PROT SERPL-MCNC: 6.2 G/DL — SIGNIFICANT CHANGE UP (ref 6–8.3)
RBC # BLD: 3.96 M/UL — LOW (ref 4.2–5.8)
RBC # FLD: 12.6 % — SIGNIFICANT CHANGE UP (ref 10.3–16.9)
SODIUM SERPL-SCNC: 134 MMOL/L — LOW (ref 135–145)
SODIUM UR-SCNC: 143 MMOL/L — SIGNIFICANT CHANGE UP
WBC # BLD: 3.4 K/UL — LOW (ref 3.8–10.5)
WBC # FLD AUTO: 3.4 K/UL — LOW (ref 3.8–10.5)

## 2018-09-23 PROCEDURE — 99233 SBSQ HOSP IP/OBS HIGH 50: CPT | Mod: GC

## 2018-09-23 RX ADMIN — HEPARIN SODIUM 5000 UNIT(S): 5000 INJECTION INTRAVENOUS; SUBCUTANEOUS at 06:32

## 2018-09-23 RX ADMIN — HEPARIN SODIUM 5000 UNIT(S): 5000 INJECTION INTRAVENOUS; SUBCUTANEOUS at 13:49

## 2018-09-23 RX ADMIN — FLUCONAZOLE 200 MILLIGRAM(S): 150 TABLET ORAL at 06:32

## 2018-09-23 RX ADMIN — Medication 1 MILLIGRAM(S): at 12:03

## 2018-09-23 RX ADMIN — Medication 1 TABLET(S): at 12:03

## 2018-09-23 RX ADMIN — PIPERACILLIN AND TAZOBACTAM 200 GRAM(S): 4; .5 INJECTION, POWDER, LYOPHILIZED, FOR SOLUTION INTRAVENOUS at 18:13

## 2018-09-23 RX ADMIN — NYSTATIN CREAM 1 APPLICATION(S): 100000 CREAM TOPICAL at 06:33

## 2018-09-23 RX ADMIN — HEPARIN SODIUM 5000 UNIT(S): 5000 INJECTION INTRAVENOUS; SUBCUTANEOUS at 21:39

## 2018-09-23 RX ADMIN — PIPERACILLIN AND TAZOBACTAM 200 GRAM(S): 4; .5 INJECTION, POWDER, LYOPHILIZED, FOR SOLUTION INTRAVENOUS at 00:58

## 2018-09-23 RX ADMIN — PIPERACILLIN AND TAZOBACTAM 200 GRAM(S): 4; .5 INJECTION, POWDER, LYOPHILIZED, FOR SOLUTION INTRAVENOUS at 06:32

## 2018-09-23 RX ADMIN — PIPERACILLIN AND TAZOBACTAM 200 GRAM(S): 4; .5 INJECTION, POWDER, LYOPHILIZED, FOR SOLUTION INTRAVENOUS at 12:02

## 2018-09-23 RX ADMIN — NYSTATIN CREAM 1 APPLICATION(S): 100000 CREAM TOPICAL at 18:19

## 2018-09-23 RX ADMIN — Medication 100 MILLIGRAM(S): at 12:03

## 2018-09-23 NOTE — PROGRESS NOTE ADULT - PROBLEM SELECTOR PLAN 5
-mostly resolved, SIADH? c/w fluid restriction    #FEN  - F: No IVF currently, if using IVF, use NS  - E: Replete for K<4 and Mg <2  N: DASH    #Prophylactic Measure  HSQ for DVT PPx

## 2018-09-23 NOTE — PROGRESS NOTE ADULT - PROBLEM SELECTOR PLAN 1
-c/w zosyn, improving  -blood cxs grew GN coccobacilli. Surveillance cultures pending. new cultures NGTD  - f/u antibiotic susceptibilities  -UCxs negative  - F/U Testicular U/S, f/u urology may not be necessary at this point

## 2018-09-23 NOTE — PROGRESS NOTE ADULT - ASSESSMENT
Patient a 63year old male with PMHx of alcohol abuse originally admitted for altered mental status and alcohol withdrawal, currrently undergoing management for bacteremia and hyponatremia, being transferred to [facility].

## 2018-09-23 NOTE — PROGRESS NOTE ADULT - SUBJECTIVE AND OBJECTIVE BOX
Patient is a 63y old  Male who presents with a chief complaint of Alcohol withdrawal, scrotal infection (22 Sep 2018 13:21)      INTERVAL HPI/OVERNIGHT EVENTS: Patient showered earlier, feels well. Walks and does not feel unsteady. Denies any scrotal pain. No fevers or chills.     Review of Systems: 12 point review of systems otherwise negative      MEDICATIONS  (STANDING):  fluconAZOLE   Tablet 200 milliGRAM(s) Oral every 24 hours  folic acid 1 milliGRAM(s) Oral daily  heparin  Injectable 5000 Unit(s) SubCutaneous every 8 hours  multivitamin 1 Tablet(s) Oral daily  nystatin Cream 1 Application(s) Topical two times a day  piperacillin/tazobactam IVPB. 3.375 Gram(s) IV Intermittent every 6 hours  thiamine 100 milliGRAM(s) Oral daily    MEDICATIONS  (PRN):      Allergies    No Known Allergies    Intolerances          Vital Signs Last 24 Hrs  T(C): 36.7 (23 Sep 2018 08:37), Max: 37.1 (22 Sep 2018 21:23)  T(F): 98 (23 Sep 2018 08:37), Max: 98.7 (22 Sep 2018 21:23)  HR: 75 (23 Sep 2018 08:37) (61 - 79)  BP: 149/88 (23 Sep 2018 08:37) (142/85 - 156/87)  BP(mean): --  RR: 16 (23 Sep 2018 08:37) (16 - 20)  SpO2: 95% (23 Sep 2018 08:37) (94% - 98%)  CAPILLARY BLOOD GLUCOSE          09-22 @ 07:01  -  09-23 @ 07:00  --------------------------------------------------------  IN: 200 mL / OUT: 0 mL / NET: 200 mL        Physical Exam:    Daily     Daily   General:  Well appearing, mildly disheveled  HEENT:  Nonicteric, PERRLA  CV:  RRR, no murmur, no JVD  Lungs:  CTA B/L, no wheezes, rales, rhonchi  Abdomen:  Soft, non-tender, no distended, positive BS, no hepatosplenomegaly  : erythema at posterior testicles, no drainage or pus, nontender  Neuro:  AAOx3, non-focal, CN II-XII grossly intact  No Restraints    LABS:                        12.8   3.4   )-----------( 122      ( 23 Sep 2018 07:26 )             37.4     09-23    134<L>  |  94<L>  |  4<L>  ----------------------------<  87  4.1   |  26  |  0.60    Ca    8.9      23 Sep 2018 07:26  Phos  4.4     09-23  Mg     2.0     09-23    TPro  6.2  /  Alb  3.6  /  TBili  0.7  /  DBili  x   /  AST  124<H>  /  ALT  89<H>  /  AlkPhos  60  09-23            RADIOLOGY & ADDITIONAL TESTS:    ---------------------------------------------------------------------------  I personally reviewed: [  ]EKG   [  ]CXR    [  ] CT    [  ]Other  ---------------------------------------------------------------------------  PLEASE CHECK WHEN PRESENT:     [  ]Heart Failure     [  ] Acute     [  ] Acute on Chronic     [  ] Chronic  -------------------------------------------------------------------     [  ]Diastolic [HFpEF]     [  ]Systolic [HFrEF]     [  ]Combined [HFpEF & HFrEF]     [  ]Other:  -------------------------------------------------------------------  [  ]GORDO     [  ]ATN     [  ]Reneal Medullary Necrosis     [  ]Renal Cortical Necrosis     [  ]Other Pathological Lesions:    [  ]CKD 1  [  ]CKD 2  [  ]CKD 3  [  ]CKD 4  [  ]CKD 5  [  ]Other  -------------------------------------------------------------------  [  ]Other/Unspecified:    --------------------------------------------------------------------    Abdominal Nutritional Status  [  ]Malnutrition: See Nutrition Note  [  ]Cachexia  [  ]Other:   [  ]Supplement Ordered:  [  ]Morbid Obesity (BMI >=40]

## 2018-09-24 LAB
ALBUMIN SERPL ELPH-MCNC: 3.6 G/DL — SIGNIFICANT CHANGE UP (ref 3.3–5)
ALP SERPL-CCNC: 59 U/L — SIGNIFICANT CHANGE UP (ref 40–120)
ALT FLD-CCNC: 103 U/L — HIGH (ref 10–45)
ANION GAP SERPL CALC-SCNC: 16 MMOL/L — SIGNIFICANT CHANGE UP (ref 5–17)
AST SERPL-CCNC: 115 U/L — HIGH (ref 10–40)
BILIRUB SERPL-MCNC: 0.7 MG/DL — SIGNIFICANT CHANGE UP (ref 0.2–1.2)
BUN SERPL-MCNC: 7 MG/DL — SIGNIFICANT CHANGE UP (ref 7–23)
CALCIUM SERPL-MCNC: 9.1 MG/DL — SIGNIFICANT CHANGE UP (ref 8.4–10.5)
CHLORIDE SERPL-SCNC: 93 MMOL/L — LOW (ref 96–108)
CO2 SERPL-SCNC: 25 MMOL/L — SIGNIFICANT CHANGE UP (ref 22–31)
CREAT SERPL-MCNC: 0.57 MG/DL — SIGNIFICANT CHANGE UP (ref 0.5–1.3)
GLUCOSE SERPL-MCNC: 91 MG/DL — SIGNIFICANT CHANGE UP (ref 70–99)
HCT VFR BLD CALC: 38.7 % — LOW (ref 39–50)
HGB BLD-MCNC: 13.2 G/DL — SIGNIFICANT CHANGE UP (ref 13–17)
MAGNESIUM SERPL-MCNC: 2.1 MG/DL — SIGNIFICANT CHANGE UP (ref 1.6–2.6)
MCHC RBC-ENTMCNC: 32.3 PG — SIGNIFICANT CHANGE UP (ref 27–34)
MCHC RBC-ENTMCNC: 34.1 G/DL — SIGNIFICANT CHANGE UP (ref 32–36)
MCV RBC AUTO: 94.6 FL — SIGNIFICANT CHANGE UP (ref 80–100)
PHOSPHATE SERPL-MCNC: 3.8 MG/DL — SIGNIFICANT CHANGE UP (ref 2.5–4.5)
PLATELET # BLD AUTO: 167 K/UL — SIGNIFICANT CHANGE UP (ref 150–400)
POTASSIUM SERPL-MCNC: 4 MMOL/L — SIGNIFICANT CHANGE UP (ref 3.5–5.3)
POTASSIUM SERPL-SCNC: 4 MMOL/L — SIGNIFICANT CHANGE UP (ref 3.5–5.3)
PROT SERPL-MCNC: 6.3 G/DL — SIGNIFICANT CHANGE UP (ref 6–8.3)
RBC # BLD: 4.09 M/UL — LOW (ref 4.2–5.8)
RBC # FLD: 12.6 % — SIGNIFICANT CHANGE UP (ref 10.3–16.9)
SODIUM SERPL-SCNC: 134 MMOL/L — LOW (ref 135–145)
WBC # BLD: 4.3 K/UL — SIGNIFICANT CHANGE UP (ref 3.8–10.5)
WBC # FLD AUTO: 4.3 K/UL — SIGNIFICANT CHANGE UP (ref 3.8–10.5)

## 2018-09-24 PROCEDURE — 99233 SBSQ HOSP IP/OBS HIGH 50: CPT | Mod: GC

## 2018-09-24 PROCEDURE — 71045 X-RAY EXAM CHEST 1 VIEW: CPT | Mod: 26

## 2018-09-24 PROCEDURE — 76870 US EXAM SCROTUM: CPT | Mod: 26

## 2018-09-24 RX ADMIN — NYSTATIN CREAM 1 APPLICATION(S): 100000 CREAM TOPICAL at 06:25

## 2018-09-24 RX ADMIN — HEPARIN SODIUM 5000 UNIT(S): 5000 INJECTION INTRAVENOUS; SUBCUTANEOUS at 13:21

## 2018-09-24 RX ADMIN — PIPERACILLIN AND TAZOBACTAM 200 GRAM(S): 4; .5 INJECTION, POWDER, LYOPHILIZED, FOR SOLUTION INTRAVENOUS at 06:24

## 2018-09-24 RX ADMIN — PIPERACILLIN AND TAZOBACTAM 200 GRAM(S): 4; .5 INJECTION, POWDER, LYOPHILIZED, FOR SOLUTION INTRAVENOUS at 00:02

## 2018-09-24 RX ADMIN — Medication 1 MILLIGRAM(S): at 11:37

## 2018-09-24 RX ADMIN — NYSTATIN CREAM 1 APPLICATION(S): 100000 CREAM TOPICAL at 17:53

## 2018-09-24 RX ADMIN — HEPARIN SODIUM 5000 UNIT(S): 5000 INJECTION INTRAVENOUS; SUBCUTANEOUS at 06:24

## 2018-09-24 RX ADMIN — PIPERACILLIN AND TAZOBACTAM 200 GRAM(S): 4; .5 INJECTION, POWDER, LYOPHILIZED, FOR SOLUTION INTRAVENOUS at 17:51

## 2018-09-24 RX ADMIN — PIPERACILLIN AND TAZOBACTAM 200 GRAM(S): 4; .5 INJECTION, POWDER, LYOPHILIZED, FOR SOLUTION INTRAVENOUS at 11:37

## 2018-09-24 RX ADMIN — HEPARIN SODIUM 5000 UNIT(S): 5000 INJECTION INTRAVENOUS; SUBCUTANEOUS at 22:35

## 2018-09-24 RX ADMIN — FLUCONAZOLE 200 MILLIGRAM(S): 150 TABLET ORAL at 06:24

## 2018-09-24 RX ADMIN — Medication 100 MILLIGRAM(S): at 11:37

## 2018-09-24 RX ADMIN — Medication 1 TABLET(S): at 11:37

## 2018-09-24 NOTE — PROGRESS NOTE ADULT - ASSESSMENT
Patient a 63year old male with PMHx of alcohol abuse originally admitted for altered mental status and alcohol withdrawal, with resolved EtOH withdrawal, resolving hyponatremia, and treatment of scrotal infection and bacteremia.

## 2018-09-24 NOTE — DIETITIAN INITIAL EVALUATION ADULT. - OTHER INFO
63M admitted with ETOH withdrawal w seizure + scrotal infection/ bacteremia, toxic metabolic encephalopathy. Hx of ETOH abuse. Moved from MICU to [facility] for further monitoring, DC planning underway to outpatient rehab facility for ETOH abuse. No noted changes in BW PTA, NKFA. No noted n/v/d/c, chewing/ swallowing issues at this time. Skin is impaired - cellultitis. Fair PO tolerance noted. Pt not appropriate for ed at this time, will follow.

## 2018-09-24 NOTE — DIETITIAN INITIAL EVALUATION ADULT. - ENERGY NEEDS
ABW used for calculations as pt between % of IBW.   ABW 62.6kg, IBW 67kg, 93% IBW, ht 67", BMI 21.6   Nutrient needs based on [facility] standards of care for repletion in adults 2/2 cellulitis  Fluid per team

## 2018-09-24 NOTE — PROGRESS NOTE ADULT - PROBLEM SELECTOR PLAN 7
#Transaminitis: AST//54 on admission likely due to alcohol abuse given ratio >2:1  -AST//105 today; has been relatively stable  -CT A/P demonstrated hepatic steatosis  -hepatitis B/C negative

## 2018-09-24 NOTE — CHART NOTE - NSCHARTNOTEFT_GEN_A_CORE
Team spoke with Dr. Lb Plascencia at [facility] detox facility to discuss patient's medical case, hospitalization.  He is currently requesting f/u CXR, which is apparently [place] and [place] state law for detox facility.  Also, requesting full medical records and labs faxed to [phone].  Dr. Lb Plascencia accepted the patient for transfer.    [place], PGY-1

## 2018-09-24 NOTE — PROGRESS NOTE ADULT - ATTENDING COMMENTS
Skin much better though still with groin/scrotal/buttock erythema. Lungs clear, RRR.  Can stop vanco, continue zosyn/diflucan. Hope to narrow coverage once bacteremia identified.  Fluid restriction for Na.
Patient seen and examined at bedside. Agree with exam, a/p as above with following addendum:     Feels well, knows he came in because he was intoxicated. Denies pain, denies agitation or anxiety. No hallucinations. CIWA 0, can discontinue ativan. Awaiting culture speciation and susceptibilities. Surveillance cultures NGTD. Pending testicular sono per urology. Scrotal infection seems to be resolving.
Pt seen and examined by me at bedside earlier in AM. Agree with housestaff's exam/a/p as noted above with additions,   Chart reviewed with HS.   VSS, exam as above with additions  +mild scrotal swelling and inner thigh erythema  labs reviewed    a/p:  1. GNR Bactere 2/2 scrotal infection-f/u ID and send of Bcx, ok ti c/w zosyn for now, repeat surveillance cx NGTD.   rest of a/p as above.
patient seen and examined    reviewed vs, labs     agree w/ PE findings as above w/ additions/ exceptions/ pertinent findings: thin elderly male, NAD, answers all questions; perrla, no photophobia b/l, MMM, +tongue fasciculations; s1s2, mild L basilar fine crackles otherwise CTA b/l; nt/nd abdomen, mild hand tremors b/l ; no lower ext edema/ tendernss b/l ; +L medial thigh and scrotal erythema noted     1. cellulitis/ scrotal infection: on zosyn, fluconazole, follow up testicular US  2. TME/ ETOH withdrawl: improving mental status, monitor CIWA, on ativan prn, thiamin, MV  3. hyponatremia: monitor BMP, on fluid restriction  4. respiratory alkalosis: improving monitor pox, respiratory sxs      rest of plan as above

## 2018-09-24 NOTE — PROGRESS NOTE ADULT - PROBLEM SELECTOR PLAN 5
Na 134 this AM, hyponatremia is resolving.  Daily BMPs as Na resolving appropriately    #FEN  - F: No IVF currently, if using IVF, use NS  - E: Replete for K<4 and Mg <2  - N: DASH    #Prophylactic Measure  HSQ for DVT PPx

## 2018-09-24 NOTE — PROGRESS NOTE ADULT - PROBLEM SELECTOR PLAN 1
Patient presented with  b/l erythema on proximal inner thighs with associated clear discharge, warmth, tenderness to palpation, and erythema   -scrotal skin cxs grew moderate gram positive cocci in pairs and chains, numerous mixed GNR, moderate alpha hemolytic strep, moderate Corynebacterium.   -blood cxs grew GN coccobacilli. Surveillance cultures pending. new cultures NGTD  - f/u antibiotic resistance; still awaiting speciation  -UCxs negative  - F/U Testicular U/S  - urology with low concern for Soni's gangrene

## 2018-09-24 NOTE — PROGRESS NOTE ADULT - SUBJECTIVE AND OBJECTIVE BOX
OVERNIGHT EVENTS:  No acute events overnight.  Denies F/C, CP, N/V/D, HA, SOB.    SUBJECTIVE / INTERVAL HPI: Patient seen and examined at bedside.     VITAL SIGNS:  Vital Signs Last 24 Hrs  T(C): 37.1 (24 Sep 2018 08:55), Max: 37.4 (23 Sep 2018 21:02)  T(F): 98.7 (24 Sep 2018 08:55), Max: 99.3 (23 Sep 2018 21:02)  HR: 80 (24 Sep 2018 08:55) (61 - 80)  BP: 130/90 (24 Sep 2018 08:55) (130/90 - 145/87)  BP(mean): --  RR: 17 (24 Sep 2018 08:55) (16 - 18)  SpO2: 97% (24 Sep 2018 08:55) (95% - 97%)    PHYSICAL EXAM:    General: Awake and alert. NAD.   HEENT: PERRL, anicteric sclera; moist mucus membranes  Neck: supple  Cardiovascular: +S1/S2, RRR, no m/r/g  Respiratory: CTAB.  No wheezing, rales, or rhonchi noted. No use of accessory muscles for inspiration.  Gastrointestinal: soft, NT/ND; +BSx4  Genitourinary: Scrotal erythema present although much improved. No tenderness to palpation.    Skin/Extremities: Erythema over b/l medial thighs with improvement.  No crepitus, edema noted.  No pus or drainage.  Vascular: 2+ radial, DP/PT pulses B/L  Neurological: No focal deficits noted.  Psych: Calm, answers questions appropriately    MEDICATIONS:  MEDICATIONS  (STANDING):  fluconAZOLE   Tablet 200 milliGRAM(s) Oral every 24 hours  folic acid 1 milliGRAM(s) Oral daily  heparin  Injectable 5000 Unit(s) SubCutaneous every 8 hours  multivitamin 1 Tablet(s) Oral daily  nystatin Cream 1 Application(s) Topical two times a day  piperacillin/tazobactam IVPB. 3.375 Gram(s) IV Intermittent every 6 hours  thiamine 100 milliGRAM(s) Oral daily    MEDICATIONS  (PRN):      ALLERGIES:  Allergies    No Known Allergies    Intolerances        LABS:                        13.2   4.3   )-----------( 167      ( 24 Sep 2018 05:39 )             38.7     09-24    134<L>  |  93<L>  |  7   ----------------------------<  91  4.0   |  25  |  0.57    Ca    9.1      24 Sep 2018 05:39  Phos  3.8     09-24  Mg     2.1     09-24    TPro  6.3  /  Alb  3.6  /  TBili  0.7  /  DBili  x   /  AST  115<H>  /  ALT  103<H>  /  AlkPhos  59  09-24        CAPILLARY BLOOD GLUCOSE          RADIOLOGY & ADDITIONAL TESTS: Reviewed.    ASSESSMENT:    PLAN: OVERNIGHT EVENTS:  No acute events overnight.  Denies F/C, CP, N/V/D, HA, SOB.    SUBJECTIVE / INTERVAL HPI: Patient seen and examined at bedside.     VITAL SIGNS:  Vital Signs Last 24 Hrs  T(C): 37.1 (24 Sep 2018 08:55), Max: 37.4 (23 Sep 2018 21:02)  T(F): 98.7 (24 Sep 2018 08:55), Max: 99.3 (23 Sep 2018 21:02)  HR: 80 (24 Sep 2018 08:55) (61 - 80)  BP: 130/90 (24 Sep 2018 08:55) (130/90 - 145/87)  BP(mean): --  RR: 17 (24 Sep 2018 08:55) (16 - 18)  SpO2: 97% (24 Sep 2018 08:55) (95% - 97%)    PHYSICAL EXAM:    General: Awake and alert. NAD.   HEENT: PERRL, anicteric sclera; moist mucus membranes  Neck: supple  Cardiovascular: +S1/S2, RRR, no m/r/g  Respiratory: CTAB.  No wheezing, rales, or rhonchi noted. No use of accessory muscles for inspiration.  Gastrointestinal: soft, NT/ND; +BSx4  Genitourinary: Scrotal erythema present although much improved. No tenderness to palpation.    Skin/Extremities: Erythema over b/l medial thighs with improvement.  No crepitus, edema noted.  No pus or drainage.  b/l erythema overlying the DIP joints of toes on physical exam  Vascular: 2+ radial, DP/PT pulses B/L  Neurological: No focal deficits noted.  Psych: Calm, answers questions appropriately    MEDICATIONS:  MEDICATIONS  (STANDING):  fluconAZOLE   Tablet 200 milliGRAM(s) Oral every 24 hours  folic acid 1 milliGRAM(s) Oral daily  heparin  Injectable 5000 Unit(s) SubCutaneous every 8 hours  multivitamin 1 Tablet(s) Oral daily  nystatin Cream 1 Application(s) Topical two times a day  piperacillin/tazobactam IVPB. 3.375 Gram(s) IV Intermittent every 6 hours  thiamine 100 milliGRAM(s) Oral daily    MEDICATIONS  (PRN):      ALLERGIES:  Allergies    No Known Allergies    Intolerances        LABS:                        13.2   4.3   )-----------( 167      ( 24 Sep 2018 05:39 )             38.7     09-24    134<L>  |  93<L>  |  7   ----------------------------<  91  4.0   |  25  |  0.57    Ca    9.1      24 Sep 2018 05:39  Phos  3.8     09-24  Mg     2.1     09-24    TPro  6.3  /  Alb  3.6  /  TBili  0.7  /  DBili  x   /  AST  115<H>  /  ALT  103<H>  /  AlkPhos  59  09-24        CAPILLARY BLOOD GLUCOSE          RADIOLOGY & ADDITIONAL TESTS: Reviewed.    ASSESSMENT:    PLAN:

## 2018-09-24 NOTE — PROGRESS NOTE ADULT - SUBJECTIVE AND OBJECTIVE BOX
SUBJECTIVE: Patient seen and examined. No acute complaints. Denies f/c/n/v/dysuria. No groin pain.    fluconAZOLE   Tablet 200 milliGRAM(s) Oral every 24 hours  piperacillin/tazobactam IVPB. 3.375 Gram(s) IV Intermittent every 6 hours      Vital Signs Last 24 Hrs  T(C): 37.1 (24 Sep 2018 08:55), Max: 37.4 (23 Sep 2018 21:02)  T(F): 98.7 (24 Sep 2018 08:55), Max: 99.3 (23 Sep 2018 21:02)  HR: 80 (24 Sep 2018 08:55) (61 - 80)  BP: 130/90 (24 Sep 2018 08:55) (130/90 - 145/87)  BP(mean): --  RR: 17 (24 Sep 2018 08:55) (16 - 18)  SpO2: 97% (24 Sep 2018 08:55) (95% - 97%)    General: NAD, resting comfortably in bed  Pulm: Nonlabored breathing, no respiratory distress  : improved scrotal erythema, no crepitus or fluctuance of periscrotal skin/perineum        LABS:                        13.2   4.3   )-----------( 167      ( 24 Sep 2018 05:39 )             38.7     09-24    134<L>  |  93<L>  |  7   ----------------------------<  91  4.0   |  25  |  0.57    Ca    9.1      24 Sep 2018 05:39  Phos  3.8     09-24  Mg     2.1     09-24    TPro  6.3  /  Alb  3.6  /  TBili  0.7  /  DBili  x   /  AST  115<H>  /  ALT  103<H>  /  AlkPhos  59  09-24

## 2018-09-24 NOTE — PROGRESS NOTE ADULT - ASSESSMENT
63M p/w alcohol withdrawal p/w scrotal erythema    -follow up abx sensitivities  -follow up testicular u/s  -Care per primary team  -Urology signing off. Please reconsult with any concerns.

## 2018-09-25 ENCOUNTER — TRANSCRIPTION ENCOUNTER (OUTPATIENT)
Age: 63
End: 2018-09-25

## 2018-09-25 VITALS
DIASTOLIC BLOOD PRESSURE: 88 MMHG | RESPIRATION RATE: 18 BRPM | TEMPERATURE: 98 F | HEART RATE: 92 BPM | OXYGEN SATURATION: 98 % | SYSTOLIC BLOOD PRESSURE: 154 MMHG

## 2018-09-25 PROBLEM — Z00.00 ENCOUNTER FOR PREVENTIVE HEALTH EXAMINATION: Status: ACTIVE | Noted: 2018-09-25

## 2018-09-25 LAB
ANION GAP SERPL CALC-SCNC: 13 MMOL/L — SIGNIFICANT CHANGE UP (ref 5–17)
BUN SERPL-MCNC: 7 MG/DL — SIGNIFICANT CHANGE UP (ref 7–23)
CALCIUM SERPL-MCNC: 9.2 MG/DL — SIGNIFICANT CHANGE UP (ref 8.4–10.5)
CHLORIDE SERPL-SCNC: 97 MMOL/L — SIGNIFICANT CHANGE UP (ref 96–108)
CO2 SERPL-SCNC: 25 MMOL/L — SIGNIFICANT CHANGE UP (ref 22–31)
CREAT SERPL-MCNC: 0.5 MG/DL — SIGNIFICANT CHANGE UP (ref 0.5–1.3)
CULTURE RESULTS: SIGNIFICANT CHANGE UP
CULTURE RESULTS: SIGNIFICANT CHANGE UP
GLUCOSE SERPL-MCNC: 91 MG/DL — SIGNIFICANT CHANGE UP (ref 70–99)
HCT VFR BLD CALC: 38 % — LOW (ref 39–50)
HGB BLD-MCNC: 13 G/DL — SIGNIFICANT CHANGE UP (ref 13–17)
MAGNESIUM SERPL-MCNC: 2.1 MG/DL — SIGNIFICANT CHANGE UP (ref 1.6–2.6)
MCHC RBC-ENTMCNC: 32.6 PG — SIGNIFICANT CHANGE UP (ref 27–34)
MCHC RBC-ENTMCNC: 34.2 G/DL — SIGNIFICANT CHANGE UP (ref 32–36)
MCV RBC AUTO: 95.2 FL — SIGNIFICANT CHANGE UP (ref 80–100)
ORGANISM # SPEC MICROSCOPIC CNT: SIGNIFICANT CHANGE UP
ORGANISM # SPEC MICROSCOPIC CNT: SIGNIFICANT CHANGE UP
PHOSPHATE SERPL-MCNC: 3.2 MG/DL — SIGNIFICANT CHANGE UP (ref 2.5–4.5)
PLATELET # BLD AUTO: 215 K/UL — SIGNIFICANT CHANGE UP (ref 150–400)
POTASSIUM SERPL-MCNC: 3.7 MMOL/L — SIGNIFICANT CHANGE UP (ref 3.5–5.3)
POTASSIUM SERPL-SCNC: 3.7 MMOL/L — SIGNIFICANT CHANGE UP (ref 3.5–5.3)
RBC # BLD: 3.99 M/UL — LOW (ref 4.2–5.8)
RBC # FLD: 12.7 % — SIGNIFICANT CHANGE UP (ref 10.3–16.9)
SODIUM SERPL-SCNC: 135 MMOL/L — SIGNIFICANT CHANGE UP (ref 135–145)
SPECIMEN SOURCE: SIGNIFICANT CHANGE UP
SPECIMEN SOURCE: SIGNIFICANT CHANGE UP
WBC # BLD: 4.2 K/UL — SIGNIFICANT CHANGE UP (ref 3.8–10.5)
WBC # FLD AUTO: 4.2 K/UL — SIGNIFICANT CHANGE UP (ref 3.8–10.5)

## 2018-09-25 PROCEDURE — 87040 BLOOD CULTURE FOR BACTERIA: CPT

## 2018-09-25 PROCEDURE — 71045 X-RAY EXAM CHEST 1 VIEW: CPT

## 2018-09-25 PROCEDURE — 87150 DNA/RNA AMPLIFIED PROBE: CPT

## 2018-09-25 PROCEDURE — 85027 COMPLETE CBC AUTOMATED: CPT

## 2018-09-25 PROCEDURE — 84133 ASSAY OF URINE POTASSIUM: CPT

## 2018-09-25 PROCEDURE — 87186 SC STD MICRODIL/AGAR DIL: CPT

## 2018-09-25 PROCEDURE — 86901 BLOOD TYPING SEROLOGIC RH(D): CPT

## 2018-09-25 PROCEDURE — 83036 HEMOGLOBIN GLYCOSYLATED A1C: CPT

## 2018-09-25 PROCEDURE — 71260 CT THORAX DX C+: CPT

## 2018-09-25 PROCEDURE — 76870 US EXAM SCROTUM: CPT

## 2018-09-25 PROCEDURE — 86900 BLOOD TYPING SEROLOGIC ABO: CPT

## 2018-09-25 PROCEDURE — 36415 COLL VENOUS BLD VENIPUNCTURE: CPT

## 2018-09-25 PROCEDURE — 80048 BASIC METABOLIC PNL TOTAL CA: CPT

## 2018-09-25 PROCEDURE — 96375 TX/PRO/DX INJ NEW DRUG ADDON: CPT | Mod: XU

## 2018-09-25 PROCEDURE — 82585 ASSAY OF CRYOFIBRINOGEN: CPT

## 2018-09-25 PROCEDURE — 82595 ASSAY OF CRYOGLOBULIN: CPT

## 2018-09-25 PROCEDURE — 99285 EMERGENCY DEPT VISIT HI MDM: CPT | Mod: 25

## 2018-09-25 PROCEDURE — 80074 ACUTE HEPATITIS PANEL: CPT

## 2018-09-25 PROCEDURE — 85730 THROMBOPLASTIN TIME PARTIAL: CPT

## 2018-09-25 PROCEDURE — 80202 ASSAY OF VANCOMYCIN: CPT

## 2018-09-25 PROCEDURE — 85025 COMPLETE CBC W/AUTO DIFF WBC: CPT

## 2018-09-25 PROCEDURE — 82140 ASSAY OF AMMONIA: CPT

## 2018-09-25 PROCEDURE — 83605 ASSAY OF LACTIC ACID: CPT

## 2018-09-25 PROCEDURE — 84300 ASSAY OF URINE SODIUM: CPT

## 2018-09-25 PROCEDURE — 70450 CT HEAD/BRAIN W/O DYE: CPT

## 2018-09-25 PROCEDURE — 84100 ASSAY OF PHOSPHORUS: CPT

## 2018-09-25 PROCEDURE — 93005 ELECTROCARDIOGRAM TRACING: CPT

## 2018-09-25 PROCEDURE — 85610 PROTHROMBIN TIME: CPT

## 2018-09-25 PROCEDURE — 87070 CULTURE OTHR SPECIMN AEROBIC: CPT

## 2018-09-25 PROCEDURE — 99233 SBSQ HOSP IP/OBS HIGH 50: CPT | Mod: GC

## 2018-09-25 PROCEDURE — 82962 GLUCOSE BLOOD TEST: CPT

## 2018-09-25 PROCEDURE — 83930 ASSAY OF BLOOD OSMOLALITY: CPT

## 2018-09-25 PROCEDURE — 80053 COMPREHEN METABOLIC PANEL: CPT

## 2018-09-25 PROCEDURE — 96374 THER/PROPH/DIAG INJ IV PUSH: CPT | Mod: XU

## 2018-09-25 PROCEDURE — 81003 URINALYSIS AUTO W/O SCOPE: CPT

## 2018-09-25 PROCEDURE — 80307 DRUG TEST PRSMV CHEM ANLYZR: CPT

## 2018-09-25 PROCEDURE — 82803 BLOOD GASES ANY COMBINATION: CPT

## 2018-09-25 PROCEDURE — 87086 URINE CULTURE/COLONY COUNT: CPT

## 2018-09-25 PROCEDURE — 84540 ASSAY OF URINE/UREA-N: CPT

## 2018-09-25 PROCEDURE — 82570 ASSAY OF URINE CREATININE: CPT

## 2018-09-25 PROCEDURE — 86850 RBC ANTIBODY SCREEN: CPT

## 2018-09-25 PROCEDURE — 99223 1ST HOSP IP/OBS HIGH 75: CPT | Mod: GC

## 2018-09-25 PROCEDURE — 74177 CT ABD & PELVIS W/CONTRAST: CPT

## 2018-09-25 PROCEDURE — 83935 ASSAY OF URINE OSMOLALITY: CPT

## 2018-09-25 PROCEDURE — 83735 ASSAY OF MAGNESIUM: CPT

## 2018-09-25 PROCEDURE — 72125 CT NECK SPINE W/O DYE: CPT

## 2018-09-25 PROCEDURE — 96372 THER/PROPH/DIAG INJ SC/IM: CPT | Mod: XU

## 2018-09-25 RX ORDER — THIAMINE MONONITRATE (VIT B1) 100 MG
1 TABLET ORAL
Qty: 0 | Refills: 0 | COMMUNITY
Start: 2018-09-25

## 2018-09-25 RX ORDER — METRONIDAZOLE 500 MG
1 TABLET ORAL
Qty: 0 | Refills: 0 | COMMUNITY
Start: 2018-09-25

## 2018-09-25 RX ORDER — METRONIDAZOLE 500 MG
500 TABLET ORAL EVERY 8 HOURS
Qty: 0 | Refills: 0 | Status: DISCONTINUED | OUTPATIENT
Start: 2018-09-25 | End: 2018-09-25

## 2018-09-25 RX ORDER — FOLIC ACID 0.8 MG
1 TABLET ORAL
Qty: 0 | Refills: 0 | COMMUNITY
Start: 2018-09-25

## 2018-09-25 RX ADMIN — PIPERACILLIN AND TAZOBACTAM 200 GRAM(S): 4; .5 INJECTION, POWDER, LYOPHILIZED, FOR SOLUTION INTRAVENOUS at 00:43

## 2018-09-25 RX ADMIN — PIPERACILLIN AND TAZOBACTAM 200 GRAM(S): 4; .5 INJECTION, POWDER, LYOPHILIZED, FOR SOLUTION INTRAVENOUS at 07:17

## 2018-09-25 RX ADMIN — Medication 100 MILLIGRAM(S): at 11:20

## 2018-09-25 RX ADMIN — NYSTATIN CREAM 1 APPLICATION(S): 100000 CREAM TOPICAL at 07:19

## 2018-09-25 RX ADMIN — HEPARIN SODIUM 5000 UNIT(S): 5000 INJECTION INTRAVENOUS; SUBCUTANEOUS at 13:42

## 2018-09-25 RX ADMIN — FLUCONAZOLE 200 MILLIGRAM(S): 150 TABLET ORAL at 07:18

## 2018-09-25 RX ADMIN — Medication 500 MILLIGRAM(S): at 16:21

## 2018-09-25 RX ADMIN — HEPARIN SODIUM 5000 UNIT(S): 5000 INJECTION INTRAVENOUS; SUBCUTANEOUS at 07:18

## 2018-09-25 RX ADMIN — PIPERACILLIN AND TAZOBACTAM 200 GRAM(S): 4; .5 INJECTION, POWDER, LYOPHILIZED, FOR SOLUTION INTRAVENOUS at 11:20

## 2018-09-25 RX ADMIN — Medication 1 TABLET(S): at 11:20

## 2018-09-25 RX ADMIN — Medication 1 MILLIGRAM(S): at 11:20

## 2018-09-25 NOTE — CONSULT NOTE ADULT - ATTENDING COMMENTS
EtOH abuse/withdrawal. Scrotal and b/l thigh SSTI (resolving) associated BSI with gram negative coccobacilli (possibly Gardnerella per MALDI ID, pending confirmatory testing). d/c Zosyn and start Flagyl as above. Patient counseled on risk of disulfiram like reaction with concomitant Flagyl and EtOH use.   Please reconsult with ?  To see again as requested

## 2018-09-25 NOTE — DISCHARGE NOTE ADULT - ADDITIONAL INSTRUCTIONS
Continue taking Flagyl 500 mg every eight hours for your infection.  Contact [facility] [phone]

## 2018-09-25 NOTE — DISCHARGE NOTE ADULT - VISION (WITH CORRECTIVE LENSES IF THE PATIENT USUALLY WEARS THEM):
Normal vision: sees adequately in most situations; can see medication labels, newsprint/Reading only

## 2018-09-25 NOTE — PROGRESS NOTE ADULT - PROBLEM SELECTOR PLAN 1
Patient presented with  b/l erythema on proximal inner thighs with associated clear discharge, warmth, tenderness to palpation, and erythema   -scrotal skin cxs grew moderate gram positive cocci in pairs and chains, numerous mixed GNR, moderate alpha hemolytic strep, moderate Corynebacterium.   -blood cxs grew GN coccobacilli. Surveillance cultures pending. new cultures NGTD  - f/u antibiotic resistance; still awaiting speciation  -UCxs negative  - F/U Testicular U/S  - urology with low concern for Soni's gangrene Patient presented with  b/l erythema on proximal inner thighs with associated clear discharge, warmth, tenderness to palpation, and erythema   -scrotal skin cxs grew moderate gram positive cocci in pairs and chains, numerous mixed GNR, moderate alpha hemolytic strep, moderate Corynebacterium.   -blood cxs grew GN coccobacilli. Surveillance cultures pending. new cultures NGTD  - f/u antibiotic resistance; still awaiting speciation  -UCxs negative  - urology with low concern for Soni's gangrene Patient presented with  b/l erythema on proximal inner thighs with associated clear discharge, warmth, tenderness to palpation, and erythema   -scrotal skin cxs grew moderate gram positive cocci in pairs and chains, numerous mixed GNR, moderate alpha hemolytic strep, moderate Corynebacterium. Likely a contaminant from swab  -blood cxs grew GN coccobacilli. Surveillance cultures pending. new cultures NGTD  - f/u antibiotic resistance; still awaiting speciation and surveillance - core lab said they had trouble identifying the source; may be a contaminant; consult ID for further recs - if it is a contaminant, is there concern for a true infection; if it is real; how would we treat.  -UCxs negative  - urology with low concern for Soni's gangrene

## 2018-09-25 NOTE — PROGRESS NOTE ADULT - PROVIDER SPECIALTY LIST ADULT
Internal Medicine
MICU
MICU
Urology
Urology
Internal Medicine
Hospitalist

## 2018-09-25 NOTE — DISCHARGE NOTE ADULT - PATIENT PORTAL LINK FT
You can access the [email or web address] Patient Portal, offered by [facility], by registering with the following website: [email or web address]

## 2018-09-25 NOTE — PROGRESS NOTE ADULT - REASON FOR ADMISSION
Alcohol withdrawal, scrotal infection

## 2018-09-25 NOTE — DISCHARGE NOTE ADULT - CARE PLAN
Principal Discharge DX:	Cellulitis of thigh  Goal:	Continue antibiotic treatment  Assessment and plan of treatment:	You came in with an infection of your scrotum and your inner thighs.  This improved with antibiotics.  A bacterial culture eventually grew a microbe called gardnerella vaginosis.  You will continue treatment with an antibiotic called Flagyl 500 mg every eight hours until and through Thursday.  Secondary Diagnosis:	Alcohol withdrawal syndrome with complication  Goal:	Continue rehab  Assessment and plan of treatment:	You were admitted to [facility] due to symptoms of alcohol withdrawal  Secondary Diagnosis:	Hyponatremia  Secondary Diagnosis:	Transition of care performed with sharing of clinical summary Principal Discharge DX:	Cellulitis of thigh  Goal:	Continue antibiotic treatment  Assessment and plan of treatment:	You came in with an infection of your scrotum and your inner thighs.  This improved with antibiotics.  A bacterial culture eventually grew a microbe called gardnerella vaginosis.  You will continue treatment with an antibiotic called Flagyl 500 mg every eight hours until and through Thursday.  Secondary Diagnosis:	Alcohol withdrawal syndrome with complication  Goal:	Continue rehab  Assessment and plan of treatment:	You were admitted to [facility] due to symptoms of alcohol withdrawal including seizure, nausea, vomiting, tremors, altered mental status.  These symptoms resolved by the time you were discharged.  You will continue treatment at a rehab facility.  Secondary Diagnosis:	Hyponatremia  Goal:	Maintain a regular diet  Assessment and plan of treatment:	Initially, your sodium was low due to both dehydration and excessive alcohol intake.  You should  Secondary Diagnosis:	Transition of care performed with sharing of clinical summary Principal Discharge DX:	Cellulitis of thigh  Goal:	Continue antibiotic treatment  Assessment and plan of treatment:	You came in with an infection of your scrotum and your inner thighs.  This improved with antibiotics.  A bacterial culture eventually grew a microbe called gardnerella vaginosis.  You will continue treatment with an antibiotic called Flagyl 500 mg every eight hours until and through Thursday.  Secondary Diagnosis:	Alcohol withdrawal syndrome with complication  Goal:	Continue rehab  Assessment and plan of treatment:	You were admitted to [facility] due to symptoms of alcohol withdrawal including seizure, nausea, vomiting, tremors, altered mental status.  These symptoms resolved by the time you were discharged.  You will continue treatment at a rehab facility.  Secondary Diagnosis:	Hyponatremia  Goal:	Maintain a regular diet  Assessment and plan of treatment:	Initially, your sodium was low due to both dehydration and excessive alcohol intake.  You should make sure to continue appropriate nutrition and hydration in order to maintain an appropriate sodium.  Secondary Diagnosis:	Transition of care performed with sharing of clinical summary  Goal:	Follow up with Health system Medicine  Assessment and plan of treatment:	1.       PCP Contacted on Admission: (Y/N) --> Name & Phone #: Pt had no PCP  2.       Date of Contact with PCP: Attempted to make appointment 9/25  3.       PCP Contacted at Discharge: (Y/N): GREGORY  4.       Summary of Handoff Given to PCP: EtOH withdrawal, hyponatremia, scrotal cellulitis  5.       Post-Discharge Appointment Date and Location: Scheduled appointment with [facility] for 10/2 - pt will call and schedule the appointment when he is discharged from rehab since he is going on a rehab. Appointment was cancelled.

## 2018-09-25 NOTE — DISCHARGE NOTE ADULT - HOSPITAL COURSE
Patient is a 63year old male with PMHx of alcohol abuse who initially presented to [facility] due to altered mental status and alcohol withdrawal, found to have scrotal erythema, edema, and tenderness to palpation.   Neuro - Initially patient with EtOH withdrawal.  Had a witnessed seizure at [facility] ED.  Initially, CIWAs 7-8, but treated with Ativan and within a couple of days, CIWAs down to 0 and CIWA 0 by discharge.  No librium taper was done.  CT Head negative for acute infarct or hemorrhage.  ID - Initially, patient had erythema and pain of b/l inner thighs and scrotum.  Initial suspicion for cellulitis vs. necrotizing fasciitis.  CT AP ruled out necrotizing fasciitis. Also had positive bacteremia for GN coccobacilli; however, by end of hospitalization, call to Core Lab stated Gardnerella Vaginosis was the infectious microbe.  Originally, treated with Clindamycin, Vanc, Zosyn due to concern for Necrotizing Fasciitis --> switched to Zosyn when bacteremia for GN coccobacilli --> switched to Flagyl when gardnerella was the infectious microbe.  Treated with Flucan and Nystatin for the b/l groin cellulitis as well.  Endocrine - Initial Sodium was 113.  With 1L NS bolus, Na went to 126.  Due to concern for overcorrection, patient given desmopressin and D5W.  Eventually, with fluid restriction, sodium back to normal by discharge.  Urology - Originally, concern for Soni's gangrene; urology team consulted.  Testicular U/S done with no acute findings.  No specific suggests per Urology.  Pulmonology - CT Chest with groundglass opacities.  Patient's O2 requirements were down titrated as tolerated and he was comfortable on room air by discharge.  CXr with clear lungs by discharge.

## 2018-09-25 NOTE — DISCHARGE NOTE ADULT - PLAN OF CARE
Continue antibiotic treatment You came in with an infection of your scrotum and your inner thighs.  This improved with antibiotics.  A bacterial culture eventually grew a microbe called gardnerella vaginosis.  You will continue treatment with an antibiotic called Flagyl 500 mg every eight hours until and through Thursday. Continue rehab You were admitted to [facility] due to symptoms of alcohol withdrawal You were admitted to [facility] due to symptoms of alcohol withdrawal including seizure, nausea, vomiting, tremors, altered mental status.  These symptoms resolved by the time you were discharged.  You will continue treatment at a rehab facility. Maintain a regular diet Initially, your sodium was low due to both dehydration and excessive alcohol intake.  You should Initially, your sodium was low due to both dehydration and excessive alcohol intake.  You should make sure to continue appropriate nutrition and hydration in order to maintain an appropriate sodium. Follow up with [facility] 1.       PCP Contacted on Admission: (Y/N) --> Name & Phone #: Pt had no PCP  2.       Date of Contact with PCP: Attempted to make appointment 9/25  3.       PCP Contacted at Discharge: (Y/N): GREGORY  4.       Summary of Handoff Given to PCP: EtOH withdrawal, hyponatremia, scrotal cellulitis  5.       Post-Discharge Appointment Date and Location: Scheduled appointment with [facility] for 10/2 - pt will call and schedule the appointment when he is discharged from rehab since he is going on a rehab. Appointment was cancelled.

## 2018-09-25 NOTE — PROGRESS NOTE ADULT - PROBLEM SELECTOR PLAN 10
1) PCP Contacted on Admission: No. Patient does not have a PCP. On asking if patient would like to follow up at [facility] or with a different PCP, he is hesitant. Name & Phone #: N/A.   2) Date of Contact with PCP:  3) PCP Contacted at Discharge: (Y/N)  4) Summary of Handoff Given to PCP:   5) Post-Discharge Appointment Date and Location:
1) PCP Contacted on Admission: No. Patient does not have a PCP. On asking if patient would like to follow up at [facility] or with a different PCP, he is hesitant. Name & Phone #: N/A.   2) Date of Contact with PCP:  3) PCP Contacted at Discharge: (Y/N)  4) Summary of Handoff Given to PCP:   5) Post-Discharge Appointment Date and Location:
1) PCP Contacted on Admission: No. Patient does not have a PCP. On asking if patient would like to follow up at [facility] or with a different PCP, he is hesitant. Name & Phone #: N/A.   2) Date of Contact with PCP:  3) PCP Contacted at Discharge: (Y/N)  4) Summary of Handoff Given to PCP:   5) Post-Discharge Appointment Date and Location:
1) PCP Contacted on Admission: No. Patient does not have a PCP. On asking if patient would like to follow up at [facility] or with a different PCP, he is hesitant. Name & Phone #: N/A.   2) Date of Contact with PCP:  3) PCP Contacted at Discharge: (Y/N)  4) Summary of Handoff Given to PCP:   5) Post-Discharge Appointment Date and Location:
1) PCP Contacted on Admission: (Y/N) --> Name & Phone #: TBD - need to ask  2) Date of Contact with PCP:  3) PCP Contacted at Discharge: (Y/N)  4) Summary of Handoff Given to PCP:   5) Post-Discharge Appointment Date and Location:

## 2018-09-25 NOTE — DISCHARGE NOTE ADULT - CARE PROVIDER_API CALL
[facility],   [address] [place] [address], [place], [place] [address]  Phone: [phone]  Fax: (       -

## 2018-09-25 NOTE — CONSULT NOTE ADULT - SUBJECTIVE AND OBJECTIVE BOX
Infectious Disease Consult Note     Patient a 63 year old male with PMHx of alcohol abuse presented to [facility] due to altered mental status and alcohol withdrawal, found to have scrotal erythema, edema, and tenderness to palpation. Patient is poor historian given clinical condition and acutely altered. Patient with poor medical follow up and unclear past medical history. Per sister collateral - patient presented to [facility] after presenting to work disheveled and drunk. Patient drinks 6-10 beers per day for the past 30+ years, never hospitalized for alcohol withdrawal in the past.  Last alcoholic beverage reportedly 2 days prior to presentation. No known history of seizure related to alcohol withdrawal or hallucinations. Patient lived with parents but was forced to leave 2 weeks ago for belligerent behavior and alcohol abuse - has been living in hotel rooms and shelters since then. Of note, prior to being forced out of living arrangement with parents, patient found unconscious in bathtub from alcohol use, EMS was called at that time and was brought to a hospital ED - patient subsequently [facility]. [facility] with erythema of b/l inner thighs and scrotum with initial suspicion for cellulitis vs. necrotizing fasciitis. Also had witnessed seizure in [facility] suspected from alcohol withdrawal given ativan, supp O2, and nasal trumpet placed. Vitals in ED Temp 99.6, 174/104, , RR 20, SpO2 94% on RA. Labs significant for Na+ 113, Mg 1.5, K 3.6, AST//78, WBC 7.4, Hb 14.5. Patient given Ativan 1mg x4, IV Magnesium 4g, Vanc x1, 1L IV NS. CT head negative for acute intracranial hemorrhage or infarct. Also demonstrated distention of the urinary bladder with a suggestion of very mild bladder wall thickening with TURP defect that may represent the sequela of bladder outlet obstruction.     In [facility] patient somnolent, tremulous, CIWA 7, skin findings as noted above, and post-obstructive diuresis with collins in place- started on clinda/vanc/zosyn/fluconazole. Na+ overcorrected 126 -in attempts to reverse overcorrection gave 1L D5W and started D5W @200cc/hr and gave 1 mcg of desmopresin IV. Also with hypoK and hypoMg s/p repletion. Patient required Ativan x2 o/n on Hospital day 1 - has not required any other ativan or librium. CT A/P with no signs of gas or abscess on thighs/scrotum, BCxs grew gram negative coccobacilli, Scrotal skin cxs grew moderate gram positive cocci in pairs and chains, numerous mixed GNR, moderate alpha hemolytic strep, moderate Corynebacterium. - clinda and vanc d/dequan. Decreased D5W to 100cc/hr, Na+ trending upward and then back downward - patient with dysregulation of ADH and is currently off fluids. Surveillance cxs drawn, pending testicular US, supplemental O2 requirements down titrated as tolerated and now on RA.     REVIEW OF SYSTEMS:   Otherwise negative except as specified in HPI    PAST MEDICAL & SURGICAL HISTORY:  Alcohol abuse  No significant past surgical history      MEDICATIONS:  MEDICATIONS  (STANDING):  folic acid 1 milliGRAM(s) Oral daily  heparin  Injectable 5000 Unit(s) SubCutaneous every 8 hours  multivitamin 1 Tablet(s) Oral daily  thiamine 100 milliGRAM(s) Oral daily    MEDICATIONS  (PRN):      ALLERGIES:  Allergies    No Known Allergies    Intolerances        VITAL SIGNS:  Vital Signs Last 24 Hrs  T(C): 36.9 (25 Sep 2018 09:15), Max: 37.1 (24 Sep 2018 15:36)  T(F): 98.4 (25 Sep 2018 09:15), Max: 98.8 (24 Sep 2018 15:36)  HR: 75 (25 Sep 2018 09:15) (68 - 81)  BP: 140/95 (25 Sep 2018 09:15) (134/82 - 177/95)  BP(mean): --  RR: 17 (25 Sep 2018 09:15) (17 - 20)  SpO2: 95% (25 Sep 2018 09:15) (95% - 98%)    09-24-18 @ 07:01  -  09-25-18 @ 07:00  --------------------------------------------------------  IN:    IV PiggyBack: 200 mL  Total IN: 200 mL    OUT:  Total OUT: 0 mL    Total NET: 200 mL          PHYSICAL EXAM:  Constitutional: WDWN resting comfortably in bed; NAD  Head: NC/AT  Eyes: PERRL, EOMI, anicteric sclera  Respiratory: CTA B/L; no W/R/R, no retractions  Cardiac: +S1/S2; RRR; no M/R/G; PMI non-displaced  Gastrointestinal: abdomen soft, NT/ND; no rebound or guarding; +BSx4  Extremities: WWP, no clubbing or cyanosis; no peripheral edema  Musculoskeletal: NROM x4; no joint swelling, tenderness or erythema  Vascular: 2+ radial, femoral, DP/PT pulses B/L  Dermatologic: b/l inner thighs with erythema involving scrotum, perineum, inner buttocks, and sacrum. Area of desquamation inner thigh buttocks and sacrum  Neurologic: AAOx3; CNII-XII grossly intact; no focal deficits  Psychiatric: affect and characteristics of appearance, verbalizations, behaviors are appropriate    LABS:                        13.0   4.2   )-----------( 215      ( 25 Sep 2018 07:56 )             38.0     09-25    135  |  97  |  7   ----------------------------<  91  3.7   |  25  |  0.50    Ca    9.2      25 Sep 2018 07:56  Phos  3.2     09-25  Mg     2.1     09-25    TPro  6.3  /  Alb  3.6  /  TBili  0.7  /  DBili  x   /  AST  115<H>  /  ALT  103<H>  /  AlkPhos  59  09-24            CAPILLARY BLOOD GLUCOSE      Culture - Blood (09.21.18 @ 16:35)    Specimen Source: .Blood Blood    Culture Results:   No growth at 3 days.    Culture - Other (09.19.18 @ 18:17)    -  Ampicillin: S <=2 Predicts results to ampicillin/sulbactam, amoxacillin-clavulanate and  piperacillin-tazobactam.    -  Tetra/Doxy: R >8    -  Vancomycin: S 2    Specimen Source: .Other scrotum skin    Culture Results:   Moderate Enterococcus faecalis  Numerous Mixed gram negative rods  Moderate Alpha hemolytic strep "Susceptibilities not performed"  Moderate Corynebacterium species "Susceptibilities not performed"    Organism Identification: Enterococcus faecalis    Organism: Enterococcus faecalis    Method Type: MAHNAZ

## 2018-09-25 NOTE — CONSULT NOTE ADULT - ASSESSMENT
64 yo male with hx of EtOH withdrawal and seizure presenting with cellulitis and GNR bacteremia which has since cleared.     Rec:   1. D/c nystatin and fluconazole  2. Patient can take 500 mg PO flagyl q 8 hr until 9/27/2018. Counseling to be provided about interaction with flagyl and alcohol.   3. No contraindications to d/c.     ID will sign off

## 2018-09-25 NOTE — PROGRESS NOTE ADULT - PROBLEM SELECTOR PLAN 2
Also as above  -c/w fluconazole 200mg daily for empiric fungal coverage. treatment course 5 days   -c/w zosyn 3.375g q6hr for GN coccobacilli  -c/w nystatin powder    #Pressure Ulcers: Patient with b/l erythema overlying the DIP joints of toes on physical exam. Possibly 2/2 pressure ulcers   -HbA1C 5.1.
Also as above  -c/w fluconazole 200mg daily for empiric fungal coverage. treatment course 5 days (today is day 2).  -c/w zosyn 3.375g q6hr for GN coccobacilli  -c/w nystatin powder    #Pressure Ulcers: Patient with b/l erythema overlying the DIP joints of toes on physical exam. Possibly 2/2 pressure ulcers   -HbA1C 5.1.
Also as above  -c/w fluconazole 200mg daily for empiric fungal coverage. treatment course 5 days (today is day 4).  -c/w zosyn 3.375g q6hr for GN coccobacilli  -c/w nystatin cream as skin is dry    #Pressure Ulcers: Patient with b/l erythema overlying the DIP joints of toes on physical exam. Possibly 2/2 pressure ulcers   -HbA1C 5.1.
Also as above  -c/w fluconazole 200mg daily for empiric fungal coverage. treatment course 5 days (today is day 2).  -c/w zosyn 3.375g q6hr for GN coccobacilli    #Pressure Ulcers: Patient with b/l erythema overlying the DIP joints of toes on physical exam. Possibly 2/2 pressure ulcers   -HbA1C 5.1.
Also as above  -c/w fluconazole 200mg daily for empiric fungal coverage. treatment course 5 days (today is day 4).  -c/w zosyn 3.375g q6hr for GN coccobacilli  -c/w nystatin cream as skin is dry    #Pressure Ulcers: Patient with b/l erythema overlying the DIP joints of toes on physical exam. Possibly 2/2 pressure ulcers   -HbA1C 5.1.

## 2018-09-25 NOTE — PROGRESS NOTE ADULT - SUBJECTIVE AND OBJECTIVE BOX
NOTE IS IN PROGRESS  OVERNIGHT EVENTS:    SUBJECTIVE / INTERVAL HPI: Patient seen and examined at bedside.     VITAL SIGNS:  Vital Signs Last 24 Hrs  T(C): 36.9 (25 Sep 2018 09:15), Max: 37.1 (24 Sep 2018 15:36)  T(F): 98.4 (25 Sep 2018 09:15), Max: 98.8 (24 Sep 2018 15:36)  HR: 75 (25 Sep 2018 09:15) (68 - 81)  BP: 140/95 (25 Sep 2018 09:15) (134/82 - 177/95)  BP(mean): --  RR: 17 (25 Sep 2018 09:15) (17 - 20)  SpO2: 95% (25 Sep 2018 09:15) (95% - 98%)    PHYSICAL EXAM:    General: WDWN  HEENT: NC/AT; PERRL, anicteric sclera; MMM  Neck: supple  Cardiovascular: +S1/S2; RRR  Respiratory: CTA B/L; no W/R/R  Gastrointestinal: soft, NT/ND; +BSx4  Extremities: WWP; no edema, clubbing or cyanosis  Vascular: 2+ radial, DP/PT pulses B/L  Neurological: AAOx3; no focal deficits    MEDICATIONS:  MEDICATIONS  (STANDING):  fluconAZOLE   Tablet 200 milliGRAM(s) Oral every 24 hours  folic acid 1 milliGRAM(s) Oral daily  heparin  Injectable 5000 Unit(s) SubCutaneous every 8 hours  multivitamin 1 Tablet(s) Oral daily  nystatin Cream 1 Application(s) Topical two times a day  piperacillin/tazobactam IVPB. 3.375 Gram(s) IV Intermittent every 6 hours  thiamine 100 milliGRAM(s) Oral daily    MEDICATIONS  (PRN):      ALLERGIES:  Allergies    No Known Allergies    Intolerances        LABS:                        13.0   4.2   )-----------( 215      ( 25 Sep 2018 07:56 )             38.0     09-25    135  |  97  |  7   ----------------------------<  91  3.7   |  25  |  0.50    Ca    9.2      25 Sep 2018 07:56  Phos  3.2     09-25  Mg     2.1     09-25    TPro  6.3  /  Alb  3.6  /  TBili  0.7  /  DBili  x   /  AST  115<H>  /  ALT  103<H>  /  AlkPhos  59  09-24        CAPILLARY BLOOD GLUCOSE          RADIOLOGY & ADDITIONAL TESTS: Reviewed.    ASSESSMENT:    PLAN: OVERNIGHT EVENTS:  No acute events overnight  [facility] had trouble identifying his previously positive blood culture - may be a contaminant.    SUBJECTIVE / INTERVAL HPI: Patient seen and examined at bedside.     VITAL SIGNS:  Vital Signs Last 24 Hrs  T(C): 36.9 (25 Sep 2018 09:15), Max: 37.1 (24 Sep 2018 15:36)  T(F): 98.4 (25 Sep 2018 09:15), Max: 98.8 (24 Sep 2018 15:36)  HR: 75 (25 Sep 2018 09:15) (68 - 81)  BP: 140/95 (25 Sep 2018 09:15) (134/82 - 177/95)  BP(mean): --  RR: 17 (25 Sep 2018 09:15) (17 - 20)  SpO2: 95% (25 Sep 2018 09:15) (95% - 98%)    PHYSICAL EXAM:    General: Awake and alert. NAD.   HEENT: PERRL, anicteric sclera; moist mucus membranes  Neck: supple  Cardiovascular: +S1/S2, RRR, no m/r/g  Respiratory: CTAB.  No wheezing, rales, or rhonchi noted. No use of accessory muscles for inspiration.  Gastrointestinal: soft, NT/ND; +BSx4  Genitourinary: Scrotal erythema present although much improved. No tenderness to palpation.    Skin/Extremities: Erythema over b/l medial thighs with improvement.  No crepitus, edema noted.  No pus or drainage.  b/l erythema overlying the DIP joints of toes on physical exam  Vascular: 2+ radial, DP/PT pulses B/L  Neurological: No focal deficits noted.  Psych: Calm, answers questions appropriately    MEDICATIONS:  MEDICATIONS  (STANDING):  fluconAZOLE   Tablet 200 milliGRAM(s) Oral every 24 hours  folic acid 1 milliGRAM(s) Oral daily  heparin  Injectable 5000 Unit(s) SubCutaneous every 8 hours  multivitamin 1 Tablet(s) Oral daily  nystatin Cream 1 Application(s) Topical two times a day  piperacillin/tazobactam IVPB. 3.375 Gram(s) IV Intermittent every 6 hours  thiamine 100 milliGRAM(s) Oral daily    MEDICATIONS  (PRN):      ALLERGIES:  Allergies    No Known Allergies    Intolerances        LABS:                        13.0   4.2   )-----------( 215      ( 25 Sep 2018 07:56 )             38.0     09-25    135  |  97  |  7   ----------------------------<  91  3.7   |  25  |  0.50    Ca    9.2      25 Sep 2018 07:56  Phos  3.2     09-25  Mg     2.1     09-25    TPro  6.3  /  Alb  3.6  /  TBili  0.7  /  DBili  x   /  AST  115<H>  /  ALT  103<H>  /  AlkPhos  59  09-24        CAPILLARY BLOOD GLUCOSE          RADIOLOGY & ADDITIONAL TESTS: Reviewed.    ASSESSMENT:    PLAN:

## 2018-09-25 NOTE — DISCHARGE NOTE ADULT - SECONDARY DIAGNOSIS.
Alcohol withdrawal syndrome with complication Hyponatremia Transition of care performed with sharing of clinical summary

## 2018-09-25 NOTE — DISCHARGE NOTE ADULT - MEDICATION SUMMARY - MEDICATIONS TO TAKE
I will START or STAY ON the medications listed below when I get home from the hospital:    metroNIDAZOLE 500 mg oral tablet  -- 1 tab(s) by mouth every 8 hours  -- Indication: For Scrotal infection    Multiple Vitamins oral tablet  -- 1 tab(s) by mouth once a day  -- Indication: For Alcohol withdrawal syndrome with complication    thiamine 100 mg oral tablet  -- 1 tab(s) by mouth once a day  -- Indication: For Alcohol withdrawal syndrome with complication    folic acid 1 mg oral tablet  -- 1 tab(s) by mouth once a day  -- Indication: For Alcohol withdrawal syndrome with complication

## 2018-09-25 NOTE — DISCHARGE NOTE ADULT - PROVIDER TOKENS
FREE:[LAST:[[facility]],PHONE:[phone]],FAX:[phone]   [phone]    -],ADDRESS:[[facility] [place] [address] [place] [address]]]

## 2018-09-26 LAB
CULTURE RESULTS: SIGNIFICANT CHANGE UP
SPECIMEN SOURCE: SIGNIFICANT CHANGE UP

## 2018-10-01 DIAGNOSIS — E87.3 ALKALOSIS: ICD-10-CM

## 2018-10-01 DIAGNOSIS — D69.6 THROMBOCYTOPENIA, UNSPECIFIED: ICD-10-CM

## 2018-10-01 DIAGNOSIS — G92 TOXIC ENCEPHALOPATHY: ICD-10-CM

## 2018-10-01 DIAGNOSIS — A41.9 SEPSIS, UNSPECIFIED ORGANISM: ICD-10-CM

## 2018-10-01 DIAGNOSIS — E87.1 HYPO-OSMOLALITY AND HYPONATREMIA: ICD-10-CM

## 2018-10-01 DIAGNOSIS — B96.89 OTHER SPECIFIED BACTERIAL AGENTS AS THE CAUSE OF DISEASES CLASSIFIED ELSEWHERE: ICD-10-CM

## 2018-10-01 DIAGNOSIS — N49.2 INFLAMMATORY DISORDERS OF SCROTUM: ICD-10-CM

## 2018-10-01 DIAGNOSIS — E83.42 HYPOMAGNESEMIA: ICD-10-CM

## 2018-10-01 DIAGNOSIS — B95.4 OTHER STREPTOCOCCUS AS THE CAUSE OF DISEASES CLASSIFIED ELSEWHERE: ICD-10-CM

## 2018-10-01 DIAGNOSIS — F10.239 ALCOHOL DEPENDENCE WITH WITHDRAWAL, UNSPECIFIED: ICD-10-CM

## 2018-10-01 DIAGNOSIS — N32.89 OTHER SPECIFIED DISORDERS OF BLADDER: ICD-10-CM

## 2018-10-01 DIAGNOSIS — E87.6 HYPOKALEMIA: ICD-10-CM

## 2018-10-01 DIAGNOSIS — L89.899 PRESSURE ULCER OF OTHER SITE, UNSPECIFIED STAGE: ICD-10-CM

## 2018-10-01 DIAGNOSIS — R74.0 NONSPECIFIC ELEVATION OF LEVELS OF TRANSAMINASE AND LACTIC ACID DEHYDROGENASE [LDH]: ICD-10-CM

## 2018-10-02 ENCOUNTER — APPOINTMENT (OUTPATIENT)
Dept: INTERNAL MEDICINE | Facility: CLINIC | Age: 63
End: 2018-10-02

## 2019-07-27 NOTE — ED ADULT NURSE NOTE - NSFALLRSKPSTHSTOCCUR_ED_ALL_ED
Pt resting comfortably on ED stretcher. NADN. AAOx3. Respirations even and unlabored. Bed locked in lowest position. Call bell within reach. Awaiting MD orders.   Multiple Falls